# Patient Record
Sex: MALE | Race: WHITE | NOT HISPANIC OR LATINO | Employment: OTHER | URBAN - METROPOLITAN AREA
[De-identification: names, ages, dates, MRNs, and addresses within clinical notes are randomized per-mention and may not be internally consistent; named-entity substitution may affect disease eponyms.]

---

## 2017-08-23 ENCOUNTER — GENERIC CONVERSION - ENCOUNTER (OUTPATIENT)
Dept: OTHER | Facility: OTHER | Age: 82
End: 2017-08-23

## 2017-08-29 ENCOUNTER — ALLSCRIPTS OFFICE VISIT (OUTPATIENT)
Dept: OTHER | Facility: OTHER | Age: 82
End: 2017-08-29

## 2017-08-29 LAB
BILIRUB UR QL STRIP: NORMAL
CLARITY UR: NORMAL
COLOR UR: NORMAL
GLUCOSE (HISTORICAL): NEGATIVE
HGB UR QL STRIP.AUTO: NORMAL
KETONES UR STRIP-MCNC: NEGATIVE MG/DL
LEUKOCYTE ESTERASE UR QL STRIP: NORMAL
NITRITE UR QL STRIP: NEGATIVE
PH UR STRIP.AUTO: 5.5 [PH]
PROT UR STRIP-MCNC: NORMAL MG/DL
SP GR UR STRIP.AUTO: 1.02
UROBILINOGEN UR QL STRIP.AUTO: 0.2

## 2018-01-01 ENCOUNTER — TRANSCRIBE ORDERS (OUTPATIENT)
Dept: ADMINISTRATIVE | Facility: HOSPITAL | Age: 83
End: 2018-01-01

## 2018-01-01 ENCOUNTER — APPOINTMENT (OUTPATIENT)
Dept: LAB | Facility: HOSPITAL | Age: 83
End: 2018-01-01
Payer: COMMERCIAL

## 2018-01-01 ENCOUNTER — OFFICE VISIT (OUTPATIENT)
Dept: URGENT CARE | Facility: CLINIC | Age: 83
End: 2018-01-01
Payer: COMMERCIAL

## 2018-01-01 ENCOUNTER — HOSPITAL ENCOUNTER (EMERGENCY)
Facility: HOSPITAL | Age: 83
Discharge: HOME | End: 2018-10-08
Attending: EMERGENCY MEDICINE
Payer: COMMERCIAL

## 2018-01-01 VITALS
SYSTOLIC BLOOD PRESSURE: 117 MMHG | HEART RATE: 78 BPM | OXYGEN SATURATION: 95 % | DIASTOLIC BLOOD PRESSURE: 73 MMHG | RESPIRATION RATE: 18 BRPM | BODY MASS INDEX: 25.18 KG/M2 | TEMPERATURE: 97.4 F | HEIGHT: 69 IN | WEIGHT: 170 LBS

## 2018-01-01 VITALS
HEART RATE: 53 BPM | SYSTOLIC BLOOD PRESSURE: 143 MMHG | TEMPERATURE: 98.3 F | DIASTOLIC BLOOD PRESSURE: 60 MMHG | OXYGEN SATURATION: 97 % | RESPIRATION RATE: 16 BRPM

## 2018-01-01 DIAGNOSIS — Z79.01 LONG TERM (CURRENT) USE OF ANTICOAGULANTS: Primary | ICD-10-CM

## 2018-01-01 DIAGNOSIS — Z79.01 LONG TERM (CURRENT) USE OF ANTICOAGULANTS: ICD-10-CM

## 2018-01-01 DIAGNOSIS — I48.91 ATRIAL FIBRILLATION, UNSPECIFIED TYPE (HCC): ICD-10-CM

## 2018-01-01 DIAGNOSIS — S09.90XA INJURY OF HEAD, INITIAL ENCOUNTER: Primary | ICD-10-CM

## 2018-01-01 DIAGNOSIS — I48.91 ATRIAL FIBRILLATION, UNSPECIFIED TYPE (HCC): Primary | ICD-10-CM

## 2018-01-01 DIAGNOSIS — S91.209A AVULSION OF TOENAIL OF LEFT FOOT: Primary | ICD-10-CM

## 2018-01-01 DIAGNOSIS — S09.90XA HEAD INJURY: ICD-10-CM

## 2018-01-01 LAB
INR PPP: 2.07 (ref 0.9–1.5)
INR PPP: 2.08 (ref 0.9–1.5)
INR PPP: 2.19 (ref 0.9–1.5)
INR PPP: 2.32 (ref 0.9–1.5)
INR PPP: 2.6 (ref 0.9–1.5)
INR PPP: 2.7 (ref 0.9–1.5)
INR PPP: 2.78 (ref 0.9–1.5)
INR PPP: 3.63 (ref 0.9–1.5)
PROTHROMBIN TIME: 24 SECONDS (ref 10.2–13)
PROTHROMBIN TIME: 24.1 SECONDS (ref 10.2–13)
PROTHROMBIN TIME: 25.6 SECONDS (ref 10.1–12.9)
PROTHROMBIN TIME: 27.2 SECONDS (ref 10.1–12.9)
PROTHROMBIN TIME: 30.2 SECONDS (ref 10.2–13)
PROTHROMBIN TIME: 31.4 SECONDS (ref 10.2–13)
PROTHROMBIN TIME: 32.7 SECONDS (ref 10.1–12.9)
PROTHROMBIN TIME: 42.3 SECONDS (ref 10.2–13)

## 2018-01-01 PROCEDURE — 36415 COLL VENOUS BLD VENIPUNCTURE: CPT

## 2018-01-01 PROCEDURE — 85610 PROTHROMBIN TIME: CPT

## 2018-01-01 PROCEDURE — 99202 OFFICE O/P NEW SF 15 MIN: CPT | Performed by: NURSE PRACTITIONER

## 2018-01-01 PROCEDURE — 99283 EMERGENCY DEPT VISIT LOW MDM: CPT

## 2018-01-01 RX ORDER — WARFARIN SODIUM 2.5 MG/1
2.5 TABLET ORAL
COMMUNITY
Start: 2018-06-13 | End: 2019-09-30 | Stop reason: HOSPADM

## 2018-01-01 RX ORDER — LEVOTHYROXINE SODIUM 0.1 MG/1
TABLET ORAL
COMMUNITY
End: 2019-09-30 | Stop reason: HOSPADM

## 2018-01-14 VITALS
WEIGHT: 171.38 LBS | BODY MASS INDEX: 25.97 KG/M2 | HEIGHT: 68 IN | DIASTOLIC BLOOD PRESSURE: 76 MMHG | SYSTOLIC BLOOD PRESSURE: 128 MMHG

## 2018-03-27 LAB
INR PPP: 2.42 (ref 0.9–1.5)
PROTHROMBIN TIME (HISTORICAL): 28.3 SEC (ref 10.1–12.9)

## 2018-04-14 LAB
ALBUMIN SERPL BCP-MCNC: 3.8 G/DL (ref 3.5–5.7)
ALP SERPL-CCNC: 98 IU/L (ref 55–165)
ALT SERPL W P-5'-P-CCNC: 15 IU/L (ref 7–26)
ANION GAP SERPL CALCULATED.3IONS-SCNC: 14.6 MM/L
ANISOCYTOSIS (HISTORICAL): SLIGHT
APTT PPP: 32 SEC (ref 24.4–37.6)
AST SERPL W P-5'-P-CCNC: 30 U/L (ref 8–27)
BACTERIA UR QL AUTO: ABNORMAL
BASOPHILS # BLD AUTO: 0.1 X3/UL (ref 0–0.3)
BASOPHILS # BLD AUTO: 1 % (ref 0–2)
BASOPHILS # BLD AUTO: 1 % (ref 0–2)
BILIRUB SERPL-MCNC: 1 MG/DL (ref 0.3–1)
BILIRUB UR QL STRIP: NEGATIVE
BUN SERPL-MCNC: 20 MG/DL (ref 7–25)
CALCIUM SERPL-MCNC: 8.7 MG/DL (ref 8.6–10.5)
CHLORIDE SERPL-SCNC: 105 MM/L (ref 98–107)
CLARITY UR: CLEAR
CO2 SERPL-SCNC: 21 MM/L (ref 21–31)
COLOR UR: YELLOW
CREAT SERPL-MCNC: 1.35 MG/DL (ref 0.7–1.3)
DEPRECATED RDW RBC AUTO: 14.2 % (ref 11.5–14.5)
EGFR (HISTORICAL): 49 GFR
EGFR AFRICAN AMERICAN (HISTORICAL): 60 GFR
EOSINOPHIL # BLD AUTO: 0.1 X3/UL (ref 0–0.5)
EOSINOPHIL # BLD AUTO: 1 % (ref 0–5)
EOSINOPHIL NFR BLD AUTO: 1 % (ref 0–5)
GLUCOSE (HISTORICAL): 115 MG/DL (ref 65–99)
GLUCOSE UR STRIP-MCNC: NEGATIVE MG/DL
HCT VFR BLD AUTO: 41.7 % (ref 42–52)
HGB BLD-MCNC: 13.4 G/DL (ref 14–18)
HGB UR QL STRIP.AUTO: ABNORMAL
INR PPP: 2.5 (ref 0.9–1.5)
KETONES UR STRIP-MCNC: NEGATIVE MG/DL
LEUKOCYTE ESTERASE UR QL STRIP: ABNORMAL
LYMPHOCYTES # BLD AUTO: 1 X3/UL (ref 1.2–4.2)
LYMPHOCYTES NFR BLD AUTO: 13 % (ref 20.5–51.1)
LYMPHOCYTES NFR BLD AUTO: 16 % (ref 20.5–51.1)
MACROCYTOSIS (HISTORICAL): SLIGHT
MCH RBC QN AUTO: 30.8 PG (ref 26–34)
MCHC RBC AUTO-ENTMCNC: 32.2 G/DL (ref 31–36)
MCV RBC AUTO: 95.5 FL (ref 81–99)
MONOCYTES # BLD AUTO: 1.3 X3/UL (ref 0–1)
MONOCYTES (HISTORICAL): 22 % (ref 1.7–12)
MONOCYTES NFR BLD AUTO: 20.8 % (ref 1.7–12)
MUCUS THREADS (HISTORICAL): ABNORMAL /HPF
NEUTROPHILS # BLD AUTO: 3.9 X3/UL (ref 1.4–6.5)
NEUTROPHILS ABS COUNT (HISTORICAL): 63 % (ref 42.2–75.2)
NEUTS SEG NFR BLD AUTO: 61.2 % (ref 42.2–75.2)
NITRITE UR QL STRIP: NEGATIVE
NON-SQ EPI CELLS URNS QL MICRO: ABNORMAL /HPF
OSMOLALITY, SERUM (HISTORICAL): 275 MOSM (ref 262–291)
OVALOCYTOSIS (HISTORICAL): SLIGHT
PH UR STRIP.AUTO: 5.5 [PH] (ref 4.5–8)
PLATELET # BLD AUTO: 231 X3/UL (ref 130–400)
PLATELET ESTIMATE (HISTORICAL): NORMAL
PMV BLD AUTO: 8.5 FL (ref 8.6–11.7)
POTASSIUM SERPL-SCNC: 4.6 MM/L (ref 3.5–5.5)
PROT UR STRIP-MCNC: NEGATIVE MG/DL
PROTHROMBIN TIME (HISTORICAL): 29.6 SEC (ref 10.1–12.9)
RBC # BLD AUTO: 4.37 X6/UL (ref 4.3–5.9)
RBC #/AREA URNS AUTO: ABNORMAL /HPF
SCHISTOCYTOSIS (HISTORICAL): SLIGHT
SODIUM SERPL-SCNC: 136 MM/L (ref 134–143)
SP GR UR STRIP.AUTO: 1.02 (ref 1–1.03)
TOTAL PROTEIN (HISTORICAL): 6.3 G/DL (ref 6.4–8.9)
UROBILINOGEN UR QL STRIP.AUTO: 0.2 EU/DL (ref 0.2–8)
WBC # BLD AUTO: 6.4 X3/UL (ref 4.8–10.8)
WBC #/AREA URNS AUTO: ABNORMAL /HPF

## 2018-04-15 LAB
ALBUMIN SERPL BCP-MCNC: 3.4 G/DL (ref 3.5–5.7)
ALP SERPL-CCNC: 91 IU/L (ref 55–165)
ALT SERPL W P-5'-P-CCNC: 13 IU/L (ref 7–26)
ANION GAP SERPL CALCULATED.3IONS-SCNC: 11.3 MM/L
AST SERPL W P-5'-P-CCNC: 23 U/L (ref 8–27)
BASOPHILS # BLD AUTO: 0 X3/UL (ref 0–0.3)
BASOPHILS # BLD AUTO: 1 % (ref 0–2)
BILIRUB SERPL-MCNC: 1.2 MG/DL (ref 0.3–1)
BUN SERPL-MCNC: 18 MG/DL (ref 7–25)
CALCIUM SERPL-MCNC: 8.4 MG/DL (ref 8.6–10.5)
CHLORIDE SERPL-SCNC: 107 MM/L (ref 98–107)
CO2 SERPL-SCNC: 22 MM/L (ref 21–31)
CREAT SERPL-MCNC: 1.29 MG/DL (ref 0.7–1.3)
DEPRECATED RDW RBC AUTO: 14.1 % (ref 11.5–14.5)
EGFR (HISTORICAL): 52 GFR
EGFR AFRICAN AMERICAN (HISTORICAL): > 60 GFR
EOSINOPHIL # BLD AUTO: 0 X3/UL (ref 0–0.5)
EOSINOPHIL NFR BLD AUTO: 1 % (ref 0–5)
GLUCOSE (HISTORICAL): 92 MG/DL (ref 65–99)
HCT VFR BLD AUTO: 38.3 % (ref 42–52)
HGB BLD-MCNC: 12.9 G/DL (ref 14–18)
INR PPP: 2.39 (ref 0.9–1.5)
LYMPHOCYTES # BLD AUTO: 1.2 X3/UL (ref 1.2–4.2)
LYMPHOCYTES NFR BLD AUTO: 26 % (ref 20.5–51.1)
MAGNESIUM SERPL-MCNC: 1.8 MG/DL (ref 1.9–2.7)
MCH RBC QN AUTO: 32.6 PG (ref 26–34)
MCHC RBC AUTO-ENTMCNC: 33.7 G/DL (ref 31–36)
MCV RBC AUTO: 96.5 FL (ref 81–99)
MONOCYTES # BLD AUTO: 1.2 X3/UL (ref 0–1)
MONOCYTES NFR BLD AUTO: 26.7 % (ref 1.7–12)
NEUTROPHILS # BLD AUTO: 2.1 X3/UL (ref 1.4–6.5)
NEUTS SEG NFR BLD AUTO: 45.3 % (ref 42.2–75.2)
OSMOLALITY, SERUM (HISTORICAL): 273 MOSM (ref 262–291)
PHOSPHATE SERPL-MCNC: 2.8 MG/DL (ref 3–5.5)
PLATELET # BLD AUTO: 192 X3/UL (ref 130–400)
PMV BLD AUTO: 8.1 FL (ref 8.6–11.7)
POTASSIUM SERPL-SCNC: 4.3 MM/L (ref 3.5–5.5)
PROTHROMBIN TIME (HISTORICAL): 28.2 SEC (ref 10.1–12.9)
RBC # BLD AUTO: 3.97 X6/UL (ref 4.3–5.9)
SODIUM SERPL-SCNC: 136 MM/L (ref 134–143)
TOTAL PROTEIN (HISTORICAL): 5.6 G/DL (ref 6.4–8.9)
TROPONIN I SERPL-MCNC: 0.06 NG/ML
TSH SERPL DL<=0.05 MIU/L-ACNC: 1.83 UIU/M (ref 0.45–5.33)
WBC # BLD AUTO: 4.6 X3/UL (ref 4.8–10.8)

## 2018-04-16 LAB
ANION GAP SERPL CALCULATED.3IONS-SCNC: 12.8 MM/L
BASOPHILS # BLD AUTO: 0 X3/UL (ref 0–0.3)
BASOPHILS # BLD AUTO: 0.7 % (ref 0–2)
BUN SERPL-MCNC: 18 MG/DL (ref 7–25)
CALCIUM SERPL-MCNC: 8.6 MG/DL (ref 8.6–10.5)
CHLORIDE SERPL-SCNC: 103 MM/L (ref 98–107)
CO2 SERPL-SCNC: 24 MM/L (ref 21–31)
CREAT SERPL-MCNC: 1.39 MG/DL (ref 0.7–1.3)
DEPRECATED RDW RBC AUTO: 14.4 % (ref 11.5–14.5)
EGFR (HISTORICAL): 48 GFR
EGFR AFRICAN AMERICAN (HISTORICAL): 58 GFR
EOSINOPHIL # BLD AUTO: 0 X3/UL (ref 0–0.5)
EOSINOPHIL NFR BLD AUTO: 0.2 % (ref 0–5)
GLUCOSE (HISTORICAL): 89 MG/DL (ref 65–99)
HCT VFR BLD AUTO: 41.2 % (ref 42–52)
HGB BLD-MCNC: 13.5 G/DL (ref 14–18)
INFLUENZA A (VIRAL ID) (HISTORICAL): NEGATIVE
INFLUENZA B (VIRAL ID) (HISTORICAL): POSITIVE
INR PPP: 2.14 (ref 0.9–1.5)
LYMPHOCYTES # BLD AUTO: 1.4 X3/UL (ref 1.2–4.2)
LYMPHOCYTES NFR BLD AUTO: 21 % (ref 20.5–51.1)
MAGNESIUM SERPL-MCNC: 1.9 MG/DL (ref 1.9–2.7)
MCH RBC QN AUTO: 31.9 PG (ref 26–34)
MCHC RBC AUTO-ENTMCNC: 32.6 G/DL (ref 31–36)
MCV RBC AUTO: 97.6 FL (ref 81–99)
MONOCYTES # BLD AUTO: 1.1 X3/UL (ref 0–1)
MONOCYTES NFR BLD AUTO: 16.8 % (ref 1.7–12)
NEUTROPHILS # BLD AUTO: 4 X3/UL (ref 1.4–6.5)
NEUTS SEG NFR BLD AUTO: 61.3 % (ref 42.2–75.2)
OSMOLALITY, SERUM (HISTORICAL): 271 MOSM (ref 262–291)
PHOSPHATE SERPL-MCNC: 3.2 MG/DL (ref 3–5.5)
PLATELET # BLD AUTO: 184 X3/UL (ref 130–400)
PMV BLD AUTO: 8.8 FL (ref 8.6–11.7)
POTASSIUM SERPL-SCNC: 4.8 MM/L (ref 3.5–5.5)
PROTHROMBIN TIME (HISTORICAL): 25.2 SEC (ref 10.1–12.9)
RBC # BLD AUTO: 4.23 X6/UL (ref 4.3–5.9)
SODIUM SERPL-SCNC: 135 MM/L (ref 134–143)
WBC # BLD AUTO: 6.6 X3/UL (ref 4.8–10.8)

## 2018-04-17 LAB
INR PPP: 1.81 (ref 0.9–1.5)
PROTHROMBIN TIME (HISTORICAL): 21.2 SEC (ref 10.1–12.9)

## 2018-04-19 LAB
ANION GAP SERPL CALCULATED.3IONS-SCNC: 13 MM/L
BASOPHILS # BLD AUTO: 0 X3/UL (ref 0–0.3)
BASOPHILS # BLD AUTO: 0.5 % (ref 0–2)
BUN SERPL-MCNC: 14 MG/DL (ref 7–25)
CALCIUM SERPL-MCNC: 8.4 MG/DL (ref 8.6–10.5)
CHLORIDE SERPL-SCNC: 104 MM/L (ref 98–107)
CO2 SERPL-SCNC: 21 MM/L (ref 21–31)
CREAT SERPL-MCNC: 1.1 MG/DL (ref 0.7–1.3)
DEPRECATED RDW RBC AUTO: 14.4 % (ref 11.5–14.5)
EGFR (HISTORICAL): > 60 GFR
EGFR AFRICAN AMERICAN (HISTORICAL): > 60 GFR
EOSINOPHIL # BLD AUTO: 0.1 X3/UL (ref 0–0.5)
EOSINOPHIL NFR BLD AUTO: 1.1 % (ref 0–5)
GLUCOSE (HISTORICAL): 83 MG/DL (ref 65–99)
HCT VFR BLD AUTO: 43.5 % (ref 42–52)
HGB BLD-MCNC: 14.4 G/DL (ref 14–18)
INR PPP: 2.5 (ref 0.9–1.5)
LYMPHOCYTES # BLD AUTO: 1.9 X3/UL (ref 1.2–4.2)
LYMPHOCYTES NFR BLD AUTO: 39.2 % (ref 20.5–51.1)
MCH RBC QN AUTO: 31.4 PG (ref 26–34)
MCHC RBC AUTO-ENTMCNC: 33.2 G/DL (ref 31–36)
MCV RBC AUTO: 94.7 FL (ref 81–99)
MONOCYTES # BLD AUTO: 0.7 X3/UL (ref 0–1)
MONOCYTES NFR BLD AUTO: 15 % (ref 1.7–12)
NEUTROPHILS # BLD AUTO: 2.1 X3/UL (ref 1.4–6.5)
NEUTS SEG NFR BLD AUTO: 44.2 % (ref 42.2–75.2)
OSMOLALITY, SERUM (HISTORICAL): 268 MOSM (ref 262–291)
PLATELET # BLD AUTO: 166 X3/UL (ref 130–400)
PMV BLD AUTO: 9.5 FL (ref 8.6–11.7)
POTASSIUM SERPL-SCNC: 4 MM/L (ref 3.5–5.5)
PROTHROMBIN TIME (HISTORICAL): 29.6 SEC (ref 10.1–12.9)
RBC # BLD AUTO: 4.59 X6/UL (ref 4.3–5.9)
SODIUM SERPL-SCNC: 134 MM/L (ref 134–143)
WBC # BLD AUTO: 4.8 X3/UL (ref 4.8–10.8)

## 2018-04-24 LAB
INR PPP: 5.61 (ref 0.9–1.5)
PROTHROMBIN TIME (HISTORICAL): 66.8 SEC (ref 10.1–12.9)

## 2018-04-27 LAB
INR PPP: 3.39 (ref 0.9–1.5)
PROTHROMBIN TIME (HISTORICAL): 40 SEC (ref 10.1–12.9)

## 2018-04-30 LAB
INR PPP: 2.84 (ref 0.9–1.5)
PROTHROMBIN TIME (HISTORICAL): 33.4 SEC (ref 10.1–12.9)

## 2018-05-08 LAB
INR PPP: 3.22 (ref 0.9–1.5)
PROTHROMBIN TIME (HISTORICAL): 37.9 SEC (ref 10.1–12.9)

## 2018-05-16 LAB
INR PPP: 2.72 (ref 0.9–1.5)
PROTHROMBIN TIME (HISTORICAL): 31.9 SEC (ref 10.1–12.9)

## 2018-05-29 LAB
INR PPP: 2.38 (ref 0.9–1.5)
PROTHROMBIN TIME (HISTORICAL): 27.8 SEC (ref 10.1–12.9)

## 2018-06-12 LAB
INR PPP: 2.68 (ref 0.9–1.5)
PROTHROMBIN TIME (HISTORICAL): 31.5 SEC (ref 10.1–12.9)

## 2018-06-26 ENCOUNTER — TRANSCRIBE ORDERS (OUTPATIENT)
Dept: ADMINISTRATIVE | Facility: HOSPITAL | Age: 83
End: 2018-06-26

## 2018-06-26 ENCOUNTER — APPOINTMENT (OUTPATIENT)
Dept: LAB | Facility: HOSPITAL | Age: 83
End: 2018-06-26
Payer: COMMERCIAL

## 2018-06-26 DIAGNOSIS — I48.91 ATRIAL FIBRILLATION, UNSPECIFIED TYPE (HCC): ICD-10-CM

## 2018-06-26 DIAGNOSIS — Z79.01 LONG TERM (CURRENT) USE OF ANTICOAGULANTS: Primary | ICD-10-CM

## 2018-06-26 DIAGNOSIS — Z79.01 LONG TERM (CURRENT) USE OF ANTICOAGULANTS: ICD-10-CM

## 2018-06-26 LAB
INR PPP: 2.32 (ref 0.9–1.5)
PROTHROMBIN TIME: 27.1 SECONDS (ref 10.1–12.9)

## 2018-06-26 PROCEDURE — 36415 COLL VENOUS BLD VENIPUNCTURE: CPT

## 2018-06-26 PROCEDURE — 85610 PROTHROMBIN TIME: CPT

## 2018-07-18 ENCOUNTER — APPOINTMENT (OUTPATIENT)
Dept: LAB | Facility: HOSPITAL | Age: 83
End: 2018-07-18
Payer: COMMERCIAL

## 2018-07-18 ENCOUNTER — TRANSCRIBE ORDERS (OUTPATIENT)
Dept: ADMINISTRATIVE | Facility: HOSPITAL | Age: 83
End: 2018-07-18

## 2018-07-18 DIAGNOSIS — I48.91 ATRIAL FIBRILLATION, UNSPECIFIED TYPE (HCC): ICD-10-CM

## 2018-07-18 DIAGNOSIS — Z79.01 LONG TERM (CURRENT) USE OF ANTICOAGULANTS: ICD-10-CM

## 2018-07-18 DIAGNOSIS — Z79.01 LONG TERM (CURRENT) USE OF ANTICOAGULANTS: Primary | ICD-10-CM

## 2018-07-18 LAB
INR PPP: 2.06 (ref 0.9–1.5)
PROTHROMBIN TIME: 24 SECONDS (ref 10.1–12.9)

## 2018-07-18 PROCEDURE — 36415 COLL VENOUS BLD VENIPUNCTURE: CPT

## 2018-07-18 PROCEDURE — 85610 PROTHROMBIN TIME: CPT

## 2018-08-07 ENCOUNTER — APPOINTMENT (OUTPATIENT)
Dept: LAB | Facility: HOSPITAL | Age: 83
End: 2018-08-07
Payer: COMMERCIAL

## 2018-08-07 ENCOUNTER — TRANSCRIBE ORDERS (OUTPATIENT)
Dept: ADMINISTRATIVE | Facility: HOSPITAL | Age: 83
End: 2018-08-07

## 2018-08-07 DIAGNOSIS — I48.91 ATRIAL FIBRILLATION, UNSPECIFIED TYPE (HCC): ICD-10-CM

## 2018-08-07 DIAGNOSIS — Z79.01 LONG TERM (CURRENT) USE OF ANTICOAGULANTS: ICD-10-CM

## 2018-08-07 DIAGNOSIS — Z79.01 LONG TERM (CURRENT) USE OF ANTICOAGULANTS: Primary | ICD-10-CM

## 2018-08-07 LAB
INR PPP: 2.31 (ref 0.9–1.5)
PROTHROMBIN TIME: 27 SECONDS (ref 10.1–12.9)

## 2018-08-07 PROCEDURE — 85610 PROTHROMBIN TIME: CPT

## 2018-08-07 PROCEDURE — 36415 COLL VENOUS BLD VENIPUNCTURE: CPT

## 2018-09-04 ENCOUNTER — TRANSCRIBE ORDERS (OUTPATIENT)
Dept: ADMINISTRATIVE | Facility: HOSPITAL | Age: 83
End: 2018-09-04

## 2018-09-04 ENCOUNTER — APPOINTMENT (OUTPATIENT)
Dept: LAB | Facility: HOSPITAL | Age: 83
End: 2018-09-04
Payer: COMMERCIAL

## 2018-09-04 DIAGNOSIS — Z79.01 LONG TERM (CURRENT) USE OF ANTICOAGULANTS: Primary | ICD-10-CM

## 2018-09-04 DIAGNOSIS — Z79.01 LONG TERM (CURRENT) USE OF ANTICOAGULANTS: ICD-10-CM

## 2018-09-04 DIAGNOSIS — I48.91 ATRIAL FIBRILLATION, UNSPECIFIED TYPE (HCC): ICD-10-CM

## 2018-09-04 LAB
INR PPP: 2.6 (ref 0.9–1.5)
PROTHROMBIN TIME: 30.5 SECONDS (ref 10.1–12.9)

## 2018-09-04 PROCEDURE — 85610 PROTHROMBIN TIME: CPT

## 2018-09-04 PROCEDURE — 36415 COLL VENOUS BLD VENIPUNCTURE: CPT

## 2018-09-18 ENCOUNTER — APPOINTMENT (OUTPATIENT)
Dept: LAB | Facility: HOSPITAL | Age: 83
End: 2018-09-18
Payer: COMMERCIAL

## 2018-09-18 ENCOUNTER — TRANSCRIBE ORDERS (OUTPATIENT)
Dept: ADMINISTRATIVE | Facility: HOSPITAL | Age: 83
End: 2018-09-18

## 2018-09-18 DIAGNOSIS — I48.91 ATRIAL FIBRILLATION, UNSPECIFIED TYPE (HCC): ICD-10-CM

## 2018-09-18 DIAGNOSIS — Z79.01 LONG TERM (CURRENT) USE OF ANTICOAGULANTS: ICD-10-CM

## 2018-09-18 DIAGNOSIS — Z79.01 LONG TERM (CURRENT) USE OF ANTICOAGULANTS: Primary | ICD-10-CM

## 2018-09-18 LAB
INR PPP: 2.64 (ref 0.9–1.5)
PROTHROMBIN TIME: 31 SECONDS (ref 10.1–12.9)

## 2018-09-18 PROCEDURE — 36415 COLL VENOUS BLD VENIPUNCTURE: CPT

## 2018-09-18 PROCEDURE — 85610 PROTHROMBIN TIME: CPT

## 2018-10-08 NOTE — ED NOTES
Pt was at home and fell out of the bed  Pt lost his right nail to the right great toe  Noted bump to the right side of head   And Small laceration to the the right eye      Elizabeth Nicole RN  10/08/18 1257

## 2018-10-08 NOTE — PROGRESS NOTES
[facility]        NAME: Richardson Gupta is a 80 y o  male  : 3/4/1924    MRN: [id number]  DATE: 2018  TIME: 12:30 PM    Assessment and Plan   Injury of head, initial encounter [S09 90XA]  1  Injury of head, initial encounter           Patient Instructions     Patient Instructions   Go to ER for evaluation- pt  And daughter agree    Follow up with PCP in 3-5 days  Proceed to  ER if symptoms worsen  Chief Complaint     Chief Complaint   Patient presents with    Head Injury     Pt fell out of bed earlier today and hit his head  Pt aslo ripped his right great toenail off  History of Present Illness       Pt  Bettina Serum out of bed while dreaming- he was "reaching for a tent in the store"  Hit head above left eye and ripped right toenail off  Is on Coumadin- here with daughter        Review of Systems   Review of Systems   Constitutional: Negative for activity change, diaphoresis, fatigue and fever  HENT: Negative for congestion, facial swelling, hearing loss, rhinorrhea, sinus pain, sinus pressure, sneezing, sore throat and voice change  Eyes: Negative for discharge and visual disturbance  Respiratory: Negative for cough, choking, chest tightness, shortness of breath, wheezing and stridor  Cardiovascular: Negative for chest pain, palpitations and leg swelling  Gastrointestinal: Negative for abdominal distention, abdominal pain, constipation, diarrhea, nausea and vomiting  Endocrine: Negative for polydipsia, polyphagia and polyuria  Genitourinary: Negative for difficulty urinating, dysuria, frequency and urgency  Musculoskeletal: Positive for gait problem  Negative for arthralgias, back pain, joint swelling, myalgias, neck pain and neck stiffness  Skin: Positive for wound  Negative for color change and rash  See hpi   Neurological: Negative for dizziness, syncope, speech difficulty, weakness, light-headedness and headaches     Hematological: Negative for adenopathy  Does not bruise/bleed easily  Psychiatric/Behavioral: Negative for agitation, behavioral problems, confusion, hallucinations, sleep disturbance and suicidal ideas  The patient is not nervous/anxious  Current Medications       Current Outpatient Prescriptions:     metoprolol tartrate (LOPRESSOR) 25 mg tablet, Take 12 5 mg by mouth, Disp: , Rfl:     warfarin (COUMADIN) 2 5 mg tablet, Take 2 5 mg by mouth, Disp: , Rfl:     levothyroxine 100 mcg tablet, Take by mouth, Disp: , Rfl:     Current Allergies     Allergies as of 10/08/2018    (No Known Allergies)            The following portions of the patient's history were reviewed and updated as appropriate: allergies, current medications, past family history, past medical history, past social history, past surgical history and problem list      No past medical history on file  No past surgical history on file  No family history on file  Medications have been verified  Objective   /60   Pulse (!) 53   Temp 98 3 °F (36 8 °C)   Resp 16   SpO2 97%        Physical Exam     Physical Exam   Constitutional: He is oriented to person, place, and time  He appears well-developed and well-nourished  No distress  Cardiovascular: Normal rate, regular rhythm and normal heart sounds  No murmur heard  Pulmonary/Chest: Effort normal and breath sounds normal  No respiratory distress  He has no wheezes  Musculoskeletal: Normal range of motion  Neurological: He is alert and oriented to person, place, and time  Skin: Skin is warm and dry  He is not diaphoretic  Psychiatric: He has a normal mood and affect  His behavior is normal  Judgment and thought content normal    Nursing note and vitals reviewed

## 2018-10-08 NOTE — ED PROVIDER NOTES
History  Chief Complaint   Patient presents with    Fall     right big toe injury  nail came off  mild bleeding  sent in by urgent care  80YEAR-OLD MALE WITH A HISTORY OF HYPERTENSION PRESENTS TO THE EMERGENCY DEPARTMENT WITH LEFT GREAT TOENAIL AVULSION AS WELL AS A HEAD INJURY AFTER FALLING AT HOME EARLIER TODAY  PATIENT DENIES ANY ANTECEDENT EVENT THAT LED UP TO THE FALL BUT MERELY HAD GOTTEN OUT OF BED SUDDENLY ACCORDING TO HIS DAUGHTER HE IS APPROPRIATE WITH NO CHANGES IN COGNITIVE /BEHAVIORAL FUNCTION  HE WAS AMBULATORY AFTER THE EVEN  S PATIENT DENIES PAIN  HE HAD VISITED [address] [place] [address] BUT BECAUSE THE USE OF COUMADIN THE PATIENT WAS REFERRED TO THE EMERGENCY DEPARTMENT FOR FURTHER EVALUATION  THERE HAS BEEN NO LOSS OF CONSCIOUSNESS NO REPORTED BLEEDING FROM ANY SOURCE  Prior to Admission Medications   Prescriptions Last Dose Informant Patient Reported? Taking?   levothyroxine 100 mcg tablet   Yes No   Sig: Take by mouth   metoprolol tartrate (LOPRESSOR) 25 mg tablet   Yes No   Sig: Take 12 5 mg by mouth   warfarin (COUMADIN) 2 5 mg tablet   Yes No   Sig: Take 2 5 mg by mouth      Facility-Administered Medications: None       Past Medical History:   Diagnosis Date    Atrial fibrillation (HCC)     Disease of thyroid gland     Hypertension        History reviewed  No pertinent surgical history  History reviewed  No pertinent family history  I have reviewed and agree with the history as documented  Social History   Substance Use Topics    Smoking status: Never Smoker    Smokeless tobacco: Never Used    Alcohol use No        Review of Systems   Constitutional: Negative for chills and fever  HENT: Negative for ear pain, rhinorrhea and sore throat  Eyes: Negative for pain, redness and visual disturbance  Respiratory: Negative for cough and shortness of breath  Cardiovascular: Negative for chest pain and leg swelling     Gastrointestinal: Negative for abdominal pain, diarrhea, nausea and vomiting  Genitourinary: Negative for dysuria, flank pain, frequency and urgency  Musculoskeletal: Negative for back pain, myalgias and neck pain  Skin: Negative for rash  Neurological: Negative for dizziness, weakness, light-headedness and headaches  Hematological: Negative  Psychiatric/Behavioral: Negative for agitation, confusion and suicidal ideas  The patient is not nervous/anxious  All other systems reviewed and are negative  Physical Exam  Physical Exam   Constitutional: He is oriented to person, place, and time  He appears well-developed and well-nourished  HENT:   Nose: Nose normal    Mouth/Throat: Oropharynx is clear and moist  No oropharyngeal exudate  A SUPERFICIAL ABRASION TO THE LEFT SIDE OF THE FOREHEAD  THERE ARE NO CALVARIAL DEFECT  Eyes: Pupils are equal, round, and reactive to light  Conjunctivae and EOM are normal  No scleral icterus  Neck: Normal range of motion  Neck supple  No JVD present  No tracheal deviation present  Cardiovascular: Normal rate, regular rhythm and normal heart sounds  No murmur heard  Pulmonary/Chest: Effort normal and breath sounds normal  No respiratory distress  He has no wheezes  He has no rales  Abdominal: Soft  Bowel sounds are normal  There is no tenderness  There is no guarding  Musculoskeletal: Normal range of motion  He exhibits no edema or tenderness  THE LEFT GREAT TOENAIL WAS AVULSED  THERE IS NO BLEEDING FROM THE BED  THERE IS NO BONY DEFORMITY A BUNION IS PRESENT ON THE LEFT LATERAL MCP JOINT  OF THE GREAT TOE  Neurological: He is alert and oriented to person, place, and time  No cranial nerve deficit or sensory deficit  He exhibits normal muscle tone  5/5 motor, nl sens   Skin: Skin is warm and dry  Psychiatric: He has a normal mood and affect  His behavior is normal    Nursing note and vitals reviewed        Vital Signs  ED Triage Vitals [10/08/18 1236]   Temperature Pulse Respirations Blood Pressure SpO2   (!) 97 4 °F (36 3 °C) (!) 52 16 117/73 95 %      Temp Source Heart Rate Source Patient Position - Orthostatic VS BP Location FiO2 (%)   Temporal Monitor Sitting Left arm --      Pain Score       No Pain           Vitals:    10/08/18 1236   BP: 117/73   Pulse: (!) 52   Patient Position - Orthostatic VS: Sitting       Visual Acuity      ED Medications  Medications - No data to display    Diagnostic Studies  Results Reviewed     None                 No orders to display              Procedures  Procedures       Phone Contacts  ED Phone Contact    ED Course                               MDM  CritCare Time    Disposition  Final diagnoses:   None     ED Disposition     None      Follow-up Information    None         Patient's Medications   Discharge Prescriptions    No medications on file     No discharge procedures on file      ED Provider  Electronically Signed by           Stuart Garcia MD  10/08/18 9001

## 2019-01-01 ENCOUNTER — TRANSCRIBE ORDERS (OUTPATIENT)
Dept: ADMINISTRATIVE | Facility: HOSPITAL | Age: 84
End: 2019-01-01

## 2019-01-01 ENCOUNTER — APPOINTMENT (OUTPATIENT)
Dept: LAB | Facility: HOSPITAL | Age: 84
End: 2019-01-01
Payer: COMMERCIAL

## 2019-01-01 ENCOUNTER — TRANSCRIBE ORDERS (OUTPATIENT)
Dept: URGENT CARE | Facility: HOSPITAL | Age: 84
End: 2019-01-01

## 2019-01-01 ENCOUNTER — APPOINTMENT (INPATIENT)
Dept: RADIOLOGY | Facility: HOSPITAL | Age: 84
DRG: 064 | End: 2019-01-01
Payer: COMMERCIAL

## 2019-01-01 ENCOUNTER — HOSPITAL ENCOUNTER (OUTPATIENT)
Facility: HOSPITAL | Age: 84
End: 2019-09-30
Attending: INTERNAL MEDICINE | Admitting: INTERNAL MEDICINE

## 2019-01-01 ENCOUNTER — APPOINTMENT (OUTPATIENT)
Dept: URGENT CARE | Facility: HOSPITAL | Age: 84
End: 2019-01-01
Payer: COMMERCIAL

## 2019-01-01 ENCOUNTER — HOSPITAL ENCOUNTER (INPATIENT)
Facility: HOSPITAL | Age: 84
LOS: 1 days | DRG: 064 | End: 2019-09-27
Attending: EMERGENCY MEDICINE | Admitting: INTERNAL MEDICINE
Payer: COMMERCIAL

## 2019-01-01 ENCOUNTER — APPOINTMENT (EMERGENCY)
Dept: CT IMAGING | Facility: HOSPITAL | Age: 84
DRG: 064 | End: 2019-01-01
Payer: COMMERCIAL

## 2019-01-01 VITALS
HEART RATE: 62 BPM | WEIGHT: 169.75 LBS | TEMPERATURE: 99.2 F | BODY MASS INDEX: 25.07 KG/M2 | SYSTOLIC BLOOD PRESSURE: 125 MMHG | DIASTOLIC BLOOD PRESSURE: 61 MMHG | RESPIRATION RATE: 16 BRPM | OXYGEN SATURATION: 92 %

## 2019-01-01 DIAGNOSIS — Z79.01 LONG TERM (CURRENT) USE OF ANTICOAGULANTS: Primary | ICD-10-CM

## 2019-01-01 DIAGNOSIS — I48.92 ATRIAL FLUTTER, UNSPECIFIED TYPE (HCC): ICD-10-CM

## 2019-01-01 DIAGNOSIS — I48.91 ATRIAL FIBRILLATION, UNSPECIFIED TYPE (HCC): ICD-10-CM

## 2019-01-01 DIAGNOSIS — I48.91 ATRIAL FIBRILLATION, UNSPECIFIED TYPE (HCC): Primary | ICD-10-CM

## 2019-01-01 DIAGNOSIS — Z79.01 LONG TERM (CURRENT) USE OF ANTICOAGULANTS: ICD-10-CM

## 2019-01-01 DIAGNOSIS — R41.82 ALTERED MENTAL STATUS: Primary | ICD-10-CM

## 2019-01-01 DIAGNOSIS — I63.232 ACUTE ISCHEMIC LEFT ICA STROKE (HCC): ICD-10-CM

## 2019-01-01 DIAGNOSIS — I48.92 ATRIAL FLUTTER, UNSPECIFIED TYPE (HCC): Primary | ICD-10-CM

## 2019-01-01 LAB
ABO GROUP BLD: NORMAL
ALBUMIN SERPL BCP-MCNC: 4.1 G/DL (ref 3.5–5.7)
ALP SERPL-CCNC: 105 U/L (ref 55–165)
ALT SERPL W P-5'-P-CCNC: 16 U/L (ref 7–52)
ANION GAP SERPL CALCULATED.3IONS-SCNC: 7 MMOL/L (ref 4–13)
APTT PPP: 41 SECONDS (ref 23–37)
AST SERPL W P-5'-P-CCNC: 22 U/L (ref 13–39)
ATRIAL RATE: 52 BPM
ATRIAL RATE: 56 BPM
BACTERIA UR CULT: ABNORMAL
BACTERIA UR QL AUTO: ABNORMAL /HPF
BASOPHILS # BLD AUTO: 0.1 THOUSANDS/ΜL (ref 0–0.1)
BASOPHILS NFR BLD AUTO: 1 % (ref 0–2)
BILIRUB SERPL-MCNC: 0.8 MG/DL (ref 0.2–1)
BILIRUB UR QL STRIP: NEGATIVE
BLD GP AB SCN SERPL QL: NEGATIVE
BUN SERPL-MCNC: 27 MG/DL (ref 7–25)
CALCIUM SERPL-MCNC: 9.4 MG/DL (ref 8.6–10.5)
CHLORIDE SERPL-SCNC: 107 MMOL/L (ref 98–107)
CLARITY UR: ABNORMAL
CO2 SERPL-SCNC: 24 MMOL/L (ref 21–31)
COLOR UR: YELLOW
CREAT SERPL-MCNC: 1.42 MG/DL (ref 0.7–1.3)
EOSINOPHIL # BLD AUTO: 0.3 THOUSAND/ΜL (ref 0–0.61)
EOSINOPHIL NFR BLD AUTO: 3 % (ref 0–5)
ERYTHROCYTE [DISTWIDTH] IN BLOOD BY AUTOMATED COUNT: 14.6 % (ref 11.5–14.5)
ETHANOL SERPL-MCNC: <10 MG/DL
GFR SERPL CREATININE-BSD FRML MDRD: 42 ML/MIN/1.73SQ M
GLUCOSE SERPL-MCNC: 141 MG/DL (ref 65–99)
GLUCOSE UR STRIP-MCNC: NEGATIVE MG/DL
HCT VFR BLD AUTO: 42.9 % (ref 42–47)
HGB BLD-MCNC: 14.3 G/DL (ref 14–18)
HGB UR QL STRIP.AUTO: ABNORMAL
INR PPP: 2.01 (ref 0.84–1.19)
INR PPP: 2.04 (ref 0.9–1.5)
INR PPP: 2.05 (ref 0.9–1.5)
INR PPP: 2.05 (ref 0.9–1.5)
INR PPP: 2.17 (ref 0.9–1.5)
INR PPP: 2.18 (ref 0.9–1.5)
INR PPP: 2.24 (ref 0.84–1.19)
INR PPP: 2.42 (ref 0.9–1.5)
INR PPP: 2.42 (ref 0.9–1.5)
INR PPP: 2.55 (ref 0.84–1.19)
INR PPP: 2.62 (ref 0.84–1.19)
INR PPP: 2.8 (ref 0.84–1.19)
INR PPP: 2.81 (ref 0.9–1.5)
INR PPP: 2.9 (ref 0.84–1.19)
INR PPP: 2.93 (ref 0.9–1.5)
INR PPP: 2.96 (ref 0.84–1.19)
INR PPP: 2.99 (ref 0.84–1.19)
INR PPP: 3.52 (ref 0.9–1.5)
INR PPP: 3.77 (ref 0.84–1.19)
KETONES UR STRIP-MCNC: NEGATIVE MG/DL
LEUKOCYTE ESTERASE UR QL STRIP: ABNORMAL
LYMPHOCYTES # BLD AUTO: 3.1 THOUSANDS/ΜL (ref 0.6–4.47)
LYMPHOCYTES NFR BLD AUTO: 31 % (ref 21–51)
MCH RBC QN AUTO: 33.9 PG (ref 26–34)
MCHC RBC AUTO-ENTMCNC: 33.4 G/DL (ref 31–37)
MCV RBC AUTO: 102 FL (ref 81–99)
MONOCYTES # BLD AUTO: 1.3 THOUSAND/ΜL (ref 0.17–1.22)
MONOCYTES NFR BLD AUTO: 13 % (ref 2–12)
NEUTROPHILS # BLD AUTO: 5.1 THOUSANDS/ΜL (ref 1.4–6.5)
NEUTS SEG NFR BLD AUTO: 52 % (ref 42–75)
NITRITE UR QL STRIP: NEGATIVE
NON-SQ EPI CELLS URNS QL MICRO: ABNORMAL /HPF
OTHER STN SPEC: ABNORMAL
PH UR STRIP.AUTO: 5.5 [PH]
PLATELET # BLD AUTO: 247 THOUSANDS/UL (ref 149–390)
PMV BLD AUTO: 8.7 FL (ref 8.6–11.7)
POTASSIUM SERPL-SCNC: 4.4 MMOL/L (ref 3.5–5.5)
PROT SERPL-MCNC: 6.8 G/DL (ref 6.4–8.9)
PROT UR STRIP-MCNC: NEGATIVE MG/DL
PROTHROMBIN TIME: 22.3 SECONDS (ref 11.6–14.5)
PROTHROMBIN TIME: 23.8 SECONDS (ref 10.2–13)
PROTHROMBIN TIME: 23.8 SECONDS (ref 10.2–13)
PROTHROMBIN TIME: 24.1 SECONDS (ref 10.2–13)
PROTHROMBIN TIME: 24.3 SECONDS (ref 11.6–14.5)
PROTHROMBIN TIME: 25.2 SECONDS (ref 10.2–13)
PROTHROMBIN TIME: 25.3 SECONDS (ref 10.2–13)
PROTHROMBIN TIME: 26.9 SECONDS (ref 11.6–14.5)
PROTHROMBIN TIME: 27.5 SECONDS (ref 11.6–14.5)
PROTHROMBIN TIME: 28.1 SECONDS (ref 10.2–13)
PROTHROMBIN TIME: 28.6 SECONDS (ref 10.2–13)
PROTHROMBIN TIME: 29 SECONDS (ref 11.6–14.5)
PROTHROMBIN TIME: 29.8 SECONDS (ref 11.6–14.5)
PROTHROMBIN TIME: 30.3 SECONDS (ref 11.6–14.5)
PROTHROMBIN TIME: 30.5 SECONDS (ref 11.6–14.5)
PROTHROMBIN TIME: 32.6 SECONDS (ref 10.2–13)
PROTHROMBIN TIME: 34.6 SECONDS (ref 10.2–13)
PROTHROMBIN TIME: 36.7 SECONDS (ref 11.6–14.5)
PROTHROMBIN TIME: 41.4 SECONDS (ref 10.2–13)
QRS AXIS: -64 DEGREES
QRS AXIS: -69 DEGREES
QRSD INTERVAL: 122 MS
QRSD INTERVAL: 128 MS
QT INTERVAL: 536 MS
QT INTERVAL: 546 MS
QTC INTERVAL: 502 MS
QTC INTERVAL: 512 MS
RBC # BLD AUTO: 4.23 MILLION/UL (ref 4.3–5.9)
RBC #/AREA URNS AUTO: ABNORMAL /HPF
RH BLD: POSITIVE
SODIUM SERPL-SCNC: 138 MMOL/L (ref 134–143)
SP GR UR STRIP.AUTO: 1.02 (ref 1–1.03)
SPECIMEN EXPIRATION DATE: NORMAL
T WAVE AXIS: 70 DEGREES
T WAVE AXIS: 76 DEGREES
UROBILINOGEN UR QL STRIP.AUTO: 0.2 E.U./DL
VENTRICULAR RATE: 53 BPM
VENTRICULAR RATE: 53 BPM
WBC # BLD AUTO: 9.9 THOUSAND/UL (ref 4.8–10.8)
WBC #/AREA URNS AUTO: ABNORMAL /HPF

## 2019-01-01 PROCEDURE — 99231 SBSQ HOSP IP/OBS SF/LOW 25: CPT | Performed by: INTERNAL MEDICINE

## 2019-01-01 PROCEDURE — 36415 COLL VENOUS BLD VENIPUNCTURE: CPT

## 2019-01-01 PROCEDURE — 36415 COLL VENOUS BLD VENIPUNCTURE: CPT | Performed by: EMERGENCY MEDICINE

## 2019-01-01 PROCEDURE — 85610 PROTHROMBIN TIME: CPT

## 2019-01-01 PROCEDURE — 99223 1ST HOSP IP/OBS HIGH 75: CPT | Performed by: HOSPITALIST

## 2019-01-01 PROCEDURE — 86901 BLOOD TYPING SEROLOGIC RH(D): CPT | Performed by: EMERGENCY MEDICINE

## 2019-01-01 PROCEDURE — 87086 URINE CULTURE/COLONY COUNT: CPT | Performed by: EMERGENCY MEDICINE

## 2019-01-01 PROCEDURE — 93010 ELECTROCARDIOGRAM REPORT: CPT | Performed by: INTERNAL MEDICINE

## 2019-01-01 PROCEDURE — 85025 COMPLETE CBC W/AUTO DIFF WBC: CPT | Performed by: EMERGENCY MEDICINE

## 2019-01-01 PROCEDURE — 99285 EMERGENCY DEPT VISIT HI MDM: CPT

## 2019-01-01 PROCEDURE — 81001 URINALYSIS AUTO W/SCOPE: CPT | Performed by: EMERGENCY MEDICINE

## 2019-01-01 PROCEDURE — 70450 CT HEAD/BRAIN W/O DYE: CPT

## 2019-01-01 PROCEDURE — 99221 1ST HOSP IP/OBS SF/LOW 40: CPT | Performed by: INTERNAL MEDICINE

## 2019-01-01 PROCEDURE — 87106 FUNGI IDENTIFICATION YEAST: CPT | Performed by: EMERGENCY MEDICINE

## 2019-01-01 PROCEDURE — 96361 HYDRATE IV INFUSION ADD-ON: CPT

## 2019-01-01 PROCEDURE — 85730 THROMBOPLASTIN TIME PARTIAL: CPT | Performed by: EMERGENCY MEDICINE

## 2019-01-01 PROCEDURE — 81003 URINALYSIS AUTO W/O SCOPE: CPT | Performed by: EMERGENCY MEDICINE

## 2019-01-01 PROCEDURE — ND001 PR NO DOCUMENTATION: Performed by: NEUROLOGICAL SURGERY

## 2019-01-01 PROCEDURE — 93005 ELECTROCARDIOGRAM TRACING: CPT

## 2019-01-01 PROCEDURE — 96360 HYDRATION IV INFUSION INIT: CPT

## 2019-01-01 PROCEDURE — 85610 PROTHROMBIN TIME: CPT | Performed by: EMERGENCY MEDICINE

## 2019-01-01 PROCEDURE — 80320 DRUG SCREEN QUANTALCOHOLS: CPT | Performed by: EMERGENCY MEDICINE

## 2019-01-01 PROCEDURE — 99238 HOSP IP/OBS DSCHRG MGMT 30/<: CPT | Performed by: PHYSICIAN ASSISTANT

## 2019-01-01 PROCEDURE — 80053 COMPREHEN METABOLIC PANEL: CPT | Performed by: EMERGENCY MEDICINE

## 2019-01-01 PROCEDURE — 99233 SBSQ HOSP IP/OBS HIGH 50: CPT | Performed by: HOSPITALIST

## 2019-01-01 PROCEDURE — 86850 RBC ANTIBODY SCREEN: CPT | Performed by: EMERGENCY MEDICINE

## 2019-01-01 PROCEDURE — 86900 BLOOD TYPING SEROLOGIC ABO: CPT | Performed by: EMERGENCY MEDICINE

## 2019-01-01 RX ORDER — LORAZEPAM 2 MG/ML
1 INJECTION INTRAMUSCULAR EVERY 4 HOURS PRN
Status: DISCONTINUED | OUTPATIENT
Start: 2019-01-01 | End: 2019-01-01

## 2019-01-01 RX ORDER — ONDANSETRON 2 MG/ML
4 INJECTION INTRAMUSCULAR; INTRAVENOUS EVERY 6 HOURS PRN
Status: DISCONTINUED | OUTPATIENT
Start: 2019-01-01 | End: 2019-01-01 | Stop reason: HOSPADM

## 2019-01-01 RX ORDER — LORAZEPAM 2 MG/ML
1 INJECTION INTRAMUSCULAR
Status: DISCONTINUED | OUTPATIENT
Start: 2019-01-01 | End: 2019-09-30 | Stop reason: HOSPADM

## 2019-01-01 RX ORDER — SCOLOPAMINE TRANSDERMAL SYSTEM 1 MG/1
1 PATCH, EXTENDED RELEASE TRANSDERMAL
Status: DISCONTINUED | OUTPATIENT
Start: 2019-01-01 | End: 2019-09-30 | Stop reason: HOSPADM

## 2019-01-01 RX ORDER — SODIUM CHLORIDE 9 MG/ML
125 INJECTION, SOLUTION INTRAVENOUS ONCE
Status: DISCONTINUED | OUTPATIENT
Start: 2019-01-01 | End: 2019-01-01

## 2019-01-01 RX ORDER — LORAZEPAM 2 MG/ML
2 INJECTION INTRAMUSCULAR ONCE
Status: DISCONTINUED | OUTPATIENT
Start: 2019-01-01 | End: 2019-01-01

## 2019-01-01 RX ORDER — ASPIRIN 81 MG/1
81 TABLET, CHEWABLE ORAL DAILY
Status: DISCONTINUED | OUTPATIENT
Start: 2019-01-01 | End: 2019-01-01

## 2019-01-01 RX ORDER — SODIUM CHLORIDE 9 MG/ML
125 INJECTION, SOLUTION INTRAVENOUS CONTINUOUS
Status: DISCONTINUED | OUTPATIENT
Start: 2019-01-01 | End: 2019-01-01

## 2019-01-01 RX ORDER — ACETAMINOPHEN 650 MG/1
650 SUPPOSITORY RECTAL EVERY 4 HOURS PRN
Status: DISCONTINUED | OUTPATIENT
Start: 2019-01-01 | End: 2019-09-30 | Stop reason: HOSPADM

## 2019-01-01 RX ORDER — LORAZEPAM 2 MG/ML
1 INJECTION INTRAMUSCULAR
Status: DISCONTINUED | OUTPATIENT
Start: 2019-01-01 | End: 2019-01-01 | Stop reason: HOSPADM

## 2019-01-01 RX ORDER — ATORVASTATIN CALCIUM 40 MG/1
40 TABLET, FILM COATED ORAL EVERY EVENING
Status: DISCONTINUED | OUTPATIENT
Start: 2019-01-01 | End: 2019-01-01

## 2019-01-01 RX ORDER — SCOLOPAMINE TRANSDERMAL SYSTEM 1 MG/1
1 PATCH, EXTENDED RELEASE TRANSDERMAL
Status: DISCONTINUED | OUTPATIENT
Start: 2019-01-01 | End: 2019-01-01 | Stop reason: HOSPADM

## 2019-01-01 RX ADMIN — SODIUM CHLORIDE 125 ML/HR: 0.9 INJECTION, SOLUTION INTRAVENOUS at 03:36

## 2019-01-01 RX ADMIN — MORPHINE SULFATE 1 MG: 2 INJECTION, SOLUTION INTRAMUSCULAR; INTRAVENOUS at 18:06

## 2019-01-01 RX ADMIN — LORAZEPAM 1 MG: 2 INJECTION INTRAMUSCULAR; INTRAVENOUS at 23:42

## 2019-01-01 RX ADMIN — LORAZEPAM 1 MG: 2 INJECTION INTRAMUSCULAR; INTRAVENOUS at 01:49

## 2019-01-01 RX ADMIN — LORAZEPAM 1 MG: 2 INJECTION INTRAMUSCULAR; INTRAVENOUS at 05:34

## 2019-01-01 RX ADMIN — MORPHINE SULFATE 2 MG: 2 INJECTION, SOLUTION INTRAMUSCULAR; INTRAVENOUS at 07:54

## 2019-01-01 RX ADMIN — MORPHINE SULFATE 2 MG: 2 INJECTION, SOLUTION INTRAMUSCULAR; INTRAVENOUS at 01:51

## 2019-01-01 RX ADMIN — LORAZEPAM 1 MG: 2 INJECTION INTRAMUSCULAR; INTRAVENOUS at 13:59

## 2019-01-01 RX ADMIN — MORPHINE SULFATE 1 MG: 2 INJECTION, SOLUTION INTRAMUSCULAR; INTRAVENOUS at 13:10

## 2019-01-01 RX ADMIN — MORPHINE SULFATE 1 MG: 2 INJECTION, SOLUTION INTRAMUSCULAR; INTRAVENOUS at 15:26

## 2019-01-01 RX ADMIN — SCOPALAMINE 1 PATCH: 1 PATCH, EXTENDED RELEASE TRANSDERMAL at 06:55

## 2019-01-01 RX ADMIN — LORAZEPAM 1 MG: 2 INJECTION INTRAMUSCULAR; INTRAVENOUS at 22:34

## 2019-01-01 RX ADMIN — MORPHINE SULFATE 2 MG: 2 INJECTION, SOLUTION INTRAMUSCULAR; INTRAVENOUS at 05:34

## 2019-01-01 RX ADMIN — LORAZEPAM 1 MG: 2 INJECTION INTRAMUSCULAR; INTRAVENOUS at 07:36

## 2019-01-01 RX ADMIN — LORAZEPAM 1 MG: 2 INJECTION INTRAMUSCULAR; INTRAVENOUS at 05:06

## 2019-01-01 RX ADMIN — MORPHINE SULFATE 2 MG: 2 INJECTION, SOLUTION INTRAMUSCULAR; INTRAVENOUS at 06:35

## 2019-01-01 RX ADMIN — LORAZEPAM 1 MG: 2 INJECTION INTRAMUSCULAR; INTRAVENOUS at 06:35

## 2019-01-01 RX ADMIN — LORAZEPAM 1 MG: 2 INJECTION INTRAMUSCULAR; INTRAVENOUS at 18:07

## 2019-01-01 RX ADMIN — MORPHINE SULFATE 1 MG: 2 INJECTION, SOLUTION INTRAMUSCULAR; INTRAVENOUS at 08:25

## 2019-01-01 RX ADMIN — MORPHINE SULFATE 1 MG: 2 INJECTION, SOLUTION INTRAMUSCULAR; INTRAVENOUS at 10:34

## 2019-01-01 RX ADMIN — LORAZEPAM 1 MG: 2 INJECTION INTRAMUSCULAR; INTRAVENOUS at 08:47

## 2019-01-01 RX ADMIN — MORPHINE SULFATE 2 MG: 2 INJECTION, SOLUTION INTRAMUSCULAR; INTRAVENOUS at 18:00

## 2019-01-01 RX ADMIN — MORPHINE SULFATE 2 MG: 2 INJECTION, SOLUTION INTRAMUSCULAR; INTRAVENOUS at 00:50

## 2019-01-01 RX ADMIN — LORAZEPAM 1 MG: 2 INJECTION INTRAMUSCULAR; INTRAVENOUS at 13:10

## 2019-01-01 RX ADMIN — MORPHINE SULFATE 2 MG: 2 INJECTION, SOLUTION INTRAMUSCULAR; INTRAVENOUS at 20:28

## 2019-01-01 RX ADMIN — MORPHINE SULFATE 2 MG: 2 INJECTION, SOLUTION INTRAMUSCULAR; INTRAVENOUS at 23:42

## 2019-01-01 RX ADMIN — SCOPALAMINE 1 PATCH: 1 PATCH, EXTENDED RELEASE TRANSDERMAL at 20:05

## 2019-01-01 RX ADMIN — MORPHINE SULFATE 2 MG: 2 INJECTION, SOLUTION INTRAMUSCULAR; INTRAVENOUS at 21:21

## 2019-01-01 RX ADMIN — MORPHINE SULFATE 2 MG: 2 INJECTION, SOLUTION INTRAMUSCULAR; INTRAVENOUS at 10:27

## 2019-01-01 RX ADMIN — MORPHINE SULFATE 2 MG: 2 INJECTION, SOLUTION INTRAMUSCULAR; INTRAVENOUS at 20:05

## 2019-01-01 RX ADMIN — MORPHINE SULFATE 2 MG: 2 INJECTION, SOLUTION INTRAMUSCULAR; INTRAVENOUS at 05:05

## 2019-01-01 RX ADMIN — LORAZEPAM 1 MG: 2 INJECTION INTRAMUSCULAR; INTRAVENOUS at 18:00

## 2019-01-01 RX ADMIN — LORAZEPAM 1 MG: 2 INJECTION INTRAMUSCULAR; INTRAVENOUS at 00:50

## 2019-01-01 RX ADMIN — LORAZEPAM 1 MG: 2 INJECTION INTRAMUSCULAR; INTRAVENOUS at 15:45

## 2019-01-01 RX ADMIN — ACETAMINOPHEN 650 MG: 650 SUPPOSITORY RECTAL at 18:00

## 2019-01-01 RX ADMIN — MORPHINE SULFATE 1 MG: 2 INJECTION, SOLUTION INTRAMUSCULAR; INTRAVENOUS at 14:12

## 2019-01-01 RX ADMIN — LORAZEPAM 1 MG: 2 INJECTION INTRAMUSCULAR; INTRAVENOUS at 01:55

## 2019-01-01 RX ADMIN — MORPHINE SULFATE 2 MG: 2 INJECTION, SOLUTION INTRAMUSCULAR; INTRAVENOUS at 01:55

## 2019-01-01 RX ADMIN — MORPHINE SULFATE 2 MG: 2 INJECTION, SOLUTION INTRAMUSCULAR; INTRAVENOUS at 04:27

## 2019-01-01 RX ADMIN — LORAZEPAM 1 MG: 2 INJECTION INTRAMUSCULAR; INTRAVENOUS at 10:27

## 2019-01-01 RX ADMIN — MORPHINE SULFATE 1 MG: 2 INJECTION, SOLUTION INTRAMUSCULAR; INTRAVENOUS at 12:03

## 2019-01-01 RX ADMIN — MORPHINE SULFATE 1 MG: 2 INJECTION, SOLUTION INTRAMUSCULAR; INTRAVENOUS at 06:27

## 2019-01-01 RX ADMIN — LORAZEPAM 1 MG: 2 INJECTION INTRAMUSCULAR; INTRAVENOUS at 20:28

## 2019-01-01 RX ADMIN — LORAZEPAM 1 MG: 2 INJECTION INTRAMUSCULAR; INTRAVENOUS at 15:23

## 2019-01-01 RX ADMIN — MORPHINE SULFATE: 2 INJECTION, SOLUTION INTRAMUSCULAR; INTRAVENOUS at 17:30

## 2019-01-01 RX ADMIN — LORAZEPAM 1 MG: 2 INJECTION INTRAMUSCULAR; INTRAVENOUS at 04:26

## 2019-01-01 RX ADMIN — MORPHINE SULFATE 1 MG: 2 INJECTION, SOLUTION INTRAMUSCULAR; INTRAVENOUS at 01:32

## 2019-01-01 RX ADMIN — MORPHINE SULFATE 1 MG: 2 INJECTION, SOLUTION INTRAMUSCULAR; INTRAVENOUS at 17:18

## 2019-01-01 RX ADMIN — MORPHINE SULFATE 2 MG: 2 INJECTION, SOLUTION INTRAMUSCULAR; INTRAVENOUS at 17:29

## 2019-01-01 RX ADMIN — LORAZEPAM 1 MG: 2 INJECTION INTRAMUSCULAR; INTRAVENOUS at 21:22

## 2019-01-01 RX ADMIN — MORPHINE SULFATE 1 MG: 2 INJECTION, SOLUTION INTRAMUSCULAR; INTRAVENOUS at 20:04

## 2019-01-01 RX ADMIN — MORPHINE SULFATE 2 MG: 2 INJECTION, SOLUTION INTRAMUSCULAR; INTRAVENOUS at 22:33

## 2019-01-01 RX ADMIN — MORPHINE SULFATE 1 MG: 2 INJECTION, SOLUTION INTRAMUSCULAR; INTRAVENOUS at 18:27

## 2019-01-01 RX ADMIN — MORPHINE SULFATE 2 MG: 2 INJECTION, SOLUTION INTRAMUSCULAR; INTRAVENOUS at 15:23

## 2019-09-26 PROBLEM — I63.232 ACUTE ISCHEMIC LEFT ICA STROKE (HCC): Status: ACTIVE | Noted: 2019-01-01

## 2019-09-26 PROBLEM — G93.40 ACUTE ENCEPHALOPATHY: Status: ACTIVE | Noted: 2019-01-01

## 2019-09-26 NOTE — SOCIAL WORK
Chart reviewed by case management, pt was in ICU on a nrb, pt was made comfort care and transferred to the med/surg floor, I met with the family to see if they had any needs, their  had just been with the pt, hospitality tray was given, cm will continue to follow

## 2019-09-26 NOTE — H&P
H&P- Bhakti Butler 3/4/1924, 80 y o  male MRN: [id number]    Unit/Bed#: ICU 01 Encounter: [id number]    Primary Care Provider: ELDER Morrison   Date and time admitted to hospital: 9/26/2019  3:09 AM    Addendum: 4428: spoke with family and they would like to pursue comfort care at this time  Will cancel MRI, echo, statin  Lorazepam, Morphine ordered  * Acute ischemic left ICA stroke (HCC)  Assessment & Plan  · Patient found on toilet, unknown time last well, approx 10pm per wife they went to bed  · CT head: Left hyperdense MCA sinus concerning for stroke  · Case reviewed by Neurology, recommend ICU admit, check MRI brain  If no improvement, hospice indicated  · Case was also reviewed with Neuro intervention by ER attending  · If improves will need PT/OT/Speech evals  · Hold aspirin for now per Neuro  · Statin if able to take PO  · Patient is DNR/DNI per discussion with daughter and wife at bedside    Acute encephalopathy  Assessment & Plan  · Secondary to stroke  · Supportive care    Hypothyroidism  Assessment & Plan  · Levothyroxine 100mcg    Essential hypertension  Assessment & Plan  · Permissive HTN  · Hold metoprolol 12 5mg    Atrial flutter (HCC)  Assessment & Plan  · Plan is to reverse coumadin per discussion with Neurology   · Check INR at 11am    VTE Prophylaxis: Warfarin (Coumadin)  Code Status: DNR/DNI  POLST: POLST is not applicable to this patient  Discussion with family: wife and daughter at bedside  Discussed likely poor outcome and may need to pursue hospice    Anticipated Length of Stay:  Patient will be admitted on an Inpatient basis with an anticipated length of stay of  > 2 midnights  Justification for Hospital Stay: acute CVA      Chief Complaint:   Altered mental status    History of Present Illness:    Bhakti Butler is a 80 y o  male who presents with altered mental status    Patient with PMHx of atrial flutter with prior CVA age 80 on coumadin, HTN, hypothyroidism was brought to ER secondary to altered mental status  Per patients wife, he went to bed at 10 pm and around 0130 am got up to go to the bathroom  She called to him and asked if he was ok and had no answer and when she checked on him found him slumped on the toilet  EMS noted episodes of posturing movement and agonal breathing with occasional periods of apnea  He was suctioned in ER secondary to secretions  ER attending spoke w/ Tele Neuro and Neuro interventional specialist, was not a candidate for any procedure  He was recommended to obtain MRI and evaluate for any change with patient status  Also to reverse his coumadin and no aspirin at this time  Review of Systems:  Review of Systems   Unable to perform ROS: Acuity of condition       Past Medical and Surgical History:   Past Medical History:   Diagnosis Date    [facility]     [facility] flutter ([facility] )     Disease of thyroid gland     Hypertension     Stroke [facility])        Past Surgical History:   Procedure Laterality Date    CATARACT EXTRACTION, BILATERAL      JOINT REPLACEMENT Right        Meds/Allergies:  Prior to Admission medications    Medication Sig Start Date End Date Taking? Authorizing Provider   levothyroxine 100 mcg tablet Take by mouth    Historical Provider, MD   metoprolol tartrate (LOPRESSOR) 25 mg tablet Take 12 5 mg by mouth 8/27/18   Historical Provider, MD   warfarin (COUMADIN) 2 5 mg tablet Take 2 5 mg by mouth 6/13/18   Historical Provider, MD     I have reviewed home medications with patient family member      Allergies: No Known Allergies    Social History:  Marital Status: /Civil Union   Occupation: retired  Patient Pre-hospital Living Situation: home  Patient Pre-hospital Level of Mobility: no assisted devices used  Patient Pre-hospital Diet Restrictions: none  Substance Use History:     Social History     Substance and Sexual Activity   Alcohol Use No     Social History     Tobacco Use   Smoking Status Never Smoker   Smokeless Tobacco Never Used     Social History     Substance and Sexual Activity   Drug Use No       Family History:  I have reviewed the patients family history    Physical Exam:   Vitals:   Blood Pressure: (!) 172/82 (09/26/19 0515)  Pulse: (!) 52 (09/26/19 0515)  Temperature: (!) 97 4 °F (36 3 °C) (09/26/19 0245)  Respirations: 20 (09/26/19 0515)  Weight - Scale: 77 kg (169 lb 12 1 oz) (09/26/19 0530)  SpO2: 100 % (09/26/19 0515)    Physical Exam   Constitutional: He appears well-developed and well-nourished  Face mask in place  HENT:   Head: Normocephalic and atraumatic  Eyes: Conjunctivae are normal  Right eye exhibits no discharge  Left eye exhibits no discharge  Neck: No tracheal deviation present  Cardiovascular: Regular rhythm and normal heart sounds  Bradycardia present  Exam reveals no gallop and no friction rub  No murmur heard  Pulmonary/Chest: No respiratory distress  He has no wheezes  He has no rales  Decreased breath sounds bilaterally   Abdominal: Soft  Bowel sounds are normal  He exhibits no distension and no mass  There is no tenderness  There is no guarding  Musculoskeletal: He exhibits no edema, tenderness or deformity  Neurological: He is unresponsive  Eyes pulsing side to side  Some movements of extremities - spontaneous, resisted some movement  NIHSS 24     Skin: Skin is warm and dry  No rash noted  No erythema  No pallor  Healed scar right knee   Psychiatric: He has a normal mood and affect  His behavior is normal  Judgment and thought content normal    Nursing note and vitals reviewed  [facility] Stroke Scale (NIHSS)          1a  Level of  Consciousness (LOC) 0 = Alert, keenly responsive  1 = Not alert, but arousable by minor        stimulation  2 = Not alert, required repeated stimulation to         attend  3 = Responds only with reflex motor or totally         unresponsive       1a   3   1b    LOC Questions  Asked to say month and his/her age 0 = Answers both questions correctly  1 = Answers one question correctly        (dysarthric, intubated)  2 = Answers neither question correctly        (aphasic, stupor)  1b   2   1c  LOC Commands  Asked to open & close eyes, then  & release with non-affected hand  0 = Performs both tasks correctly  1 = Performs one task correctly  2 = Performs neither task correctly  1c   2     2  Best Gaze  Asked to follow with eyes through horizontal plane  0 = Normal  1 = Partial gaze palsy  2 = Forced deviation or total gaze paresis  2   0   3  Visual  Visual fields (quadrants) tested with finger counting or visual threat  0 = No visual loss  1 = Partial hemianopia (extinction)  2 = Complete hemianopia  3 = Bilateral hemianopia (including         blindness)  3   0   4  Facial Palsy  Asked to show teeth & raise eyebrows  0 = Normal symmetrical movement  1 = Minor paralysis  2 = Partial paralysis (total/near total         paralysis of lower face)  3 = Complete paralysis of one or both         sides (upper & lower)  4   0   5  Motor Arm  Asked to extend arms (palm down) 90º (if sitting) or 45º (if supine) & hold for 10 seconds  0 = No drift; arm stays at 90º/45º for full 10        seconds  1 = Drift; arm drifts down but does not hit         bed or other support  2 = Some effort against gravity; drifts down         to bed or support  3 = No effort against gravity: arm falls to         bed or support  4 = No movement  9 = Amputation, joint fusion  5a  (Left)       2      5b  (Right)        2    6  Motor Leg  While supine, asked to hold leg at 30º for 5 seconds  0 = No drift; leg stays at 30º for full 5        seconds  1 = Drift; leg drifts down but does not hit         the bed or other support  2 = Some effort against gravity; drifts down         to bed or support  3 = No effort against gravity; leg falls to         bed or support  4 = No movement    9 = Amputation, joint fusion  6a   (Left)       2        6b  (Right)       2   7  Limb Ataxia  Finger - nose & heel shin tests on both sides  0 = Absent  1 = Present in one limb  2 = Present in two limbs  7   2   8  Sensory  Sensation or grimace to pin prick or withdrawal from noxious stimuli in obtunded or aphasic patient   0 = Normal; no sensory loss  1 = Mild / moderate sensory loss; may be        dulled / "not as sharp"  2 = Severe / total sensory loss; coma     8   0   9  Best Language  Asked to describe a picture, name objects & read simple words  (See NIHSS language tools)  0 = No aphasia; normal  1 = Mild / moderate aphasia; some loss of        fluency / comprehension without        limitation of expression of ideas (can        identify picture from patient's        responses)  2 = Severe aphasia (cannot identify         pictures from responses)  3 = Mute; global aphasia; no usable        speech; cannot follow simple        commands  9   3   10  Dysarthria   0 = Normal   1 = Mild / moderate; slurs some words;         can be understood  2 = Severe; so slurred as to be         unintelligible; mute;anarthric     9 = Intubated or other physical barrier  10   3   11  Extinction & Inattention  Look at Visual (from #3) and double simultaneous tactile     0 = No abnormality  1 = Inattention or extinction in one sensory         modality  2 = Profound ap[place] inattention or        inattention to more than one modality;        does not recognize own hand; orients        to only one side of space  11   1     Complete NIHSS Score (0-42): 24         Additional Data:   Lab Results: I have personally reviewed pertinent reports        Results from last 7 days   Lab Units 09/26/19  0251   WBC Thousand/uL 9 90   HEMOGLOBIN g/dL 14 3   HEMATOCRIT % 42 9   PLATELETS Thousands/uL 247   NEUTROS PCT % 52   LYMPHS PCT % 31   MONOS PCT % 13*   EOS PCT % 3     Results from last 7 days   Lab Units 09/26/19  0251   POTASSIUM mmol/L 4 4   CHLORIDE mmol/L 107   CO2 mmol/L 24   BUN mg/dL 27*   CREATININE mg/dL 1 42*   CALCIUM mg/dL 9 4   ALK PHOS U/L 105   ALT U/L 16   AST U/L 22     Results from last 7 days   Lab Units 09/26/19  0251   INR  3 52*       Imaging: I have personally reviewed pertinent reports  CT head without contrast   Final Result by Norma Weir MD (09/26 0335)      Left hyperdense MCA sinus concerning for stroke  I personally discussed this study with Gopal Maravilla on 9/26/2019 at 3:33 AM                Workstation performed: [id number]         MRI Inpatient Order    (Results Pending)       NetAccess/Epic Records Reviewed: Yes     ** Please Note: This note has been constructed using a voice recognition system   **

## 2019-09-26 NOTE — PLAN OF CARE
Problem: DISCHARGE PLANNING - CARE MANAGEMENT  Goal: Discharge to post-acute care or home with appropriate resources  Description  INTERVENTIONS:  - Conduct assessment to determine patient/family and health care team treatment goals, and need for post-acute services based on payer coverage, community resources, and patient preferences, and barriers to discharge  - Address psychosocial, clinical, and financial barriers to discharge as identified in assessment in conjunction with the patient/family and health care team  - Arrange appropriate level of post-acute services according to patients   needs and preference and payer coverage in collaboration with the physician and health care team  - Communicate with and update the patient/family, physician, and health care team regarding progress on the discharge plan  - Arrange appropriate transportation to post-acute venues  Outcome: Progressing

## 2019-09-26 NOTE — ED NOTES
Placed on monitor, pulse ox on arrival  fsbs 124  100% BVM assisting resp-intermittant apneic periods per RT        Nuzhat Russell RN  09/26/19 3814

## 2019-09-26 NOTE — NURSING NOTE
Pt received from ed  Pt moved to the bed x 4 assist  Pt is unresponsive with irregular resps  All monitors attached  Pt is on 100% nrb spo2 is 100%  nt suctioned pt for thick clear blood tinged secretions  Family at bedside discussed pt condition and decision made to make pt comfortable  hospitalist notified and pt made comfort care  Report given to next shift

## 2019-09-26 NOTE — ED NOTES
Family at bedside  Wife reports patient had walked to bathroom at 0139       Nneka Payton RN  09/26/19 2742

## 2019-09-26 NOTE — UTILIZATION REVIEW
Initial Clinical Review    Admission: Date/Time/Statement: Inpatient Admission Orders (From admission, onward)     Ordered        09/26/19 0513  Inpatient Admission (expected length of stay for this patient Order details is greater than two midnights)  Once                   Orders Placed This Encounter   Procedures    Inpatient Admission (expected length of stay for this patient Order details is greater than two midnights)     Standing Status:   Standing     Number of Occurrences:   1     Order Specific Question:   Admitting Physician     Answer:   Maria G Devi [[id number]]     Order Specific Question:   Level of Care     Answer:   Critical Care [15]     Order Specific Question:   Estimated length of stay     Answer:   More than 2 Midnights     Order Specific Question:   Certification     Answer:   I certify that inpatient services are medically necessary for this patient for a duration of greater than two midnights  See H&P and MD Progress Notes for additional information about the patient's course of treatment  ED Arrival Information     Expected Arrival Acuity Means of Arrival Escorted By Service Admission Type    - 9/26/2019 02:36 Emergent [facility] Emergency    Arrival Complaint    Unresponsive        Chief Complaint   Patient presents with    Altered Mental Status     found in bathroom unresponsive by wife at approx 2 am       Assessment/Plan:   81 yo male,  To ER from home  via EMS,  Admitted in @ [facility] level of care,  For comfort care in setting of acute stroke  Wife reports Last seen normal at 10 pm  Around 0130, patient went to bathroom and did not return to bed; Wife found him unresponsive in bathroom  IN ER,  Remains unresponsive,  Intermittent posturing movements ,  All extremities fall to the ground equally upon passive elevation  Patient is not an IV tpa candidate given inr of 3 5, on coumadin  GCS 6   Ct head concerning for hyperdense left mca, pt is normally well functioning  cta head/neck can be done later if renal function improves  Pt should be admitted to ICU at [address] and INR reversed with vitamin k and ffp  (family later declined this intervention)    sbp 160-200  Pressors if needed  Mri brain w/o contrast  Tele  If no clinical improvement patient may need eventual comfort measures  9/26  @  0919 -  Pt transferred to [facility] floor, unresponsive  For comfort care per family wishes  9/26  @  1310 -  IV ativan x2  9/26 IV MV 1 mg given x5 (last dose 1412)       ED Triage Vitals   Temperature Pulse Respirations Blood Pressure SpO2   09/26/19 0245 09/26/19 0245 09/26/19 0245 09/26/19 0245 09/26/19 0245   (!) 97 4 °F (36 3 °C) (!) 53 20 152/83 98 %      Temp Source Heart Rate Source Patient Position - Orthostatic VS BP Location FiO2 (%)   09/26/19 0600 09/26/19 0600 09/26/19 0600 09/26/19 0600 --   Temporal Monitor Lying Right arm       Pain Score       09/26/19 0623       No Pain        Wt Readings from Last 1 Encounters:   09/26/19 77 kg (169 lb 12 1 oz)     Additional Vital Signs:   09/26/19 0800  --  61  23Abnormal   165/73  105  100 %  --  --   09/26/19 0740  100 °F   54Abnormal   23Abnormal   164/72               Pertinent Labs/Diagnostic Test Results:     9/26  CT head -  Left hyperdense MCA sinus concerning for stroke      Results from last 7 days   Lab Units 09/26/19  0251   WBC Thousand/uL 9 90   HEMOGLOBIN g/dL 14 3   HEMATOCRIT % 42 9   PLATELETS Thousands/uL 247   NEUTROS ABS Thousands/µL 5 10         Results from last 7 days   Lab Units 09/26/19  0251   SODIUM mmol/L 138   POTASSIUM mmol/L 4 4   CHLORIDE mmol/L 107   CO2 mmol/L 24   ANION GAP mmol/L 7   BUN mg/dL 27*   CREATININE mg/dL 1 42*   EGFR ml/min/1 73sq m 42   CALCIUM mg/dL 9 4     Results from last 7 days   Lab Units 09/26/19  0251   AST U/L 22   ALT U/L 16   ALK PHOS U/L 105   TOTAL PROTEIN g/dL 6 8   ALBUMIN g/dL 4 1   TOTAL BILIRUBIN mg/dL 0 80         Results from last 7 days   Lab Units 09/26/19  0251   GLUCOSE RANDOM mg/dL 141*     Results from last 7 days   Lab Units 09/26/19  0251   PROTIME seconds 41 4*   INR  3 52*   PTT seconds 41*     Results from last 7 days   Lab Units 09/26/19  0522   CLARITY UA  Slightly Cloudy*   COLOR UA  Yellow   SPEC GRAV UA  1 020   PH UA  5 5   GLUCOSE UA mg/dl Negative   KETONES UA mg/dl Negative   BLOOD UA  2+*   PROTEIN UA mg/dl Negative   NITRITE UA  Negative   BILIRUBIN UA  Negative   UROBILINOGEN UA E U /dl 0 2   LEUKOCYTES UA  3+*   WBC UA /hpf Innumerable*   RBC UA /hpf 4-10*   BACTERIA UA /hpf None Seen   EPITHELIAL CELLS WET PREP /hpf Occasional     Results from last 7 days   Lab Units 09/26/19  0328   ETHANOL LVL mg/dL <10     ED Treatment:   Medication Administration from 09/26/2019 0236 to 09/26/2019 0600       Date/Time Order Dose Route     09/26/2019 0336 sodium chloride 0 9 % infusion 125 mL/hr Intravenous        Past Medical History:   Diagnosis Date    Atrial fibrillation ([facility] )     Atrial flutter ([facility] )     Disease of thyroid gland     Hypertension     Stroke [facility])      Present on Admission:   [facility]CA [facility]   Acute encephalopathy   [facility] fl[facility]   Essential hypertension   Hypothyroidism      Admitting Diagnosis: Altered mental status [R41 82]  Acute ischemic left ICA stroke ([facility] ) [I63 232]  Age/Sex: 80 y o  male     Admission Orders: Allow permissive HTN,  Hold metoprolol,  NPO; Telemetry;  VS + NEURO CHECKS Q1HR X 4 - Q4HRS; Case mgt and neurology consult      Network Utilization Review Department  Phone: [phone]; Fax [phone]  [email or web address]  ATTENTION: Please call with any questions or concerns to [phone]  and carefully listen to the prompts so that you are directed to the right person     Send all requests for admission clinical reviews, approved or denied determinations and any other requests to fax [phone]   All voicemails are confidential

## 2019-09-26 NOTE — PROGRESS NOTES
Called regarding Mr Eva Shabazz from [facility] ER  Stroke alert was not initiated by [facility] but CT concerning for left ICA terminus stroke  Per ED he is GCS6 with posturing and LKW 10PM  Family is hesitant for agressive measures and patient is DNR  Based on current exam and advanced directives, likely not candidate, however, should family be interested in further evaluation could consider transfer to CTP  If interested he will require formal neurology evaluation for NIHSS  Not tpa candidate 2/2 coumadin

## 2019-09-26 NOTE — PLAN OF CARE
Problem: Prexisting or High Potential for Compromised Skin Integrity  Goal: Skin integrity is maintained or improved  Description  INTERVENTIONS:  - Identify patients at risk for skin breakdown  - Assess and monitor skin integrity  - Assess and monitor nutrition and hydration status  - Monitor labs   - Assess for incontinence   - Turn and reposition patient  - Assist with mobility/ambulation  - Relieve pressure over bony prominences  - Avoid friction and shearing  - Provide appropriate hygiene as needed including keeping skin clean and dry  - Evaluate need for skin moisturizer/barrier cream  - Collaborate with interdisciplinary team   - Patient/family teaching  - Consider wound care consult   Outcome: Progressing     Problem: CARDIOVASCULAR - ADULT  Goal: Maintains optimal cardiac output and hemodynamic stability  Description  INTERVENTIONS:  - Monitor I/O, vital signs and rhythm  - Monitor for S/S and trends of decreased cardiac output  - Administer and titrate ordered vasoactive medications to optimize hemodynamic stability  - Assess quality of pulses, skin color and temperature  - Assess for signs of decreased coronary artery perfusion  - Instruct patient to report change in severity of symptoms  Outcome: Progressing  Goal: Absence of cardiac dysrhythmias or at baseline rhythm  Description  INTERVENTIONS:  - Continuous cardiac monitoring, vital signs, obtain 12 lead EKG if ordered  - Administer antiarrhythmic and heart rate control medications as ordered  - Monitor electrolytes and administer replacement therapy as ordered  Outcome: Progressing     Problem: SKIN/TISSUE INTEGRITY - ADULT  Goal: Skin integrity remains intact  Description  INTERVENTIONS  - Identify patients at risk for skin breakdown  - Assess and monitor skin integrity  - Assess and monitor nutrition and hydration status  - Monitor labs (i e  albumin)  - Assess for incontinence   - Turn and reposition patient  - Assist with mobility/ambulation  - Relieve pressure over bony prominences  - Avoid friction and shearing  - Provide appropriate hygiene as needed including keeping skin clean and dry  - Evaluate need for skin moisturizer/barrier cream  - Collaborate with interdisciplinary team (i e  Nutrition, Rehabilitation, etc )   - Patient/family teaching  Outcome: Progressing  Goal: Oral mucous membranes remain intact  Description  INTERVENTIONS  - Assess oral mucosa and hygiene practices  - Implement preventative oral hygiene regimen  - Implement oral medicated treatments as ordered  - Initiate Nutrition services referral as needed  Outcome: Progressing     Problem: COPING  Goal: Pt/Family able to verbalize concerns and demonstrate effective coping strategies  Description  INTERVENTIONS:  - Assist patient/family to identify coping skills, available support systems and cultural and spiritual values  - Provide emotional support, including active listening and acknowledgement of concerns of patient and caregivers  - Reduce environmental stimuli, as able  - Provide patient education  - Assess for spiritual pain/suffering and initiate spiritual care, including notification of Pastoral Care or vitaliy based community as needed  - Assess effectiveness of coping strategies  Outcome: Progressing  Goal: Will report anxiety at manageable levels  Description  INTERVENTIONS:  - Administer medication as ordered  - Teach and encourage coping skills  - Provide emotional support  - Assess patient/family for anxiety and ability to cope  Outcome: Progressing

## 2019-09-26 NOTE — NURSING NOTE
Pt maintained on comfort care, morphine and ativan given for mild restlessness and increased respirations  Family at bedside  Will continue to monitor

## 2019-09-26 NOTE — NURSING NOTE
Pt transferred to the Med Surg floor unresponsive, no movement to pain, rhonci present upon ascultation, respiratory rate is WDL  Oxygen and IV discontinued for comfort care, russell catheter maintained  Family educated on available pain medication  Will continue to monitor

## 2019-09-26 NOTE — QUICK NOTE
Stroke alert 4:20 am  Neurology connected at 4:20 am through PACS  NIH stroke scale 21  Last normal at 10 pm  Around 1, 1:30 pm patient got up to go to the bathroom and wife went to look for him as he did not return and found him unresponsive  Per my conversation with ED team patient is nonverbal, not following commands  Intermittent posturing movements of all extremities  All extremities fall to the ground equally upon passive elevation  No seizure like activity noted  Patient is not an IV tpa candidate given inr of 3 5, on coumadin  NIH stroke scale yet to be calculated  GCS 6  Ct head concerning for hyperdense left mca, pt is normally well functioning as he does not drive  Creatinine 1 42, last creatinine 1 26 1 year ago  cta head/neck not done and can be done later if renal function improves  Given that patient does not appear to be an interventional candidate not urgent at this given time  I've discussed case with ED attending  Family hesitant for aggressive measures and given the current exam does not appear to be candidate for endovascular intervention  Pt should be admitted to ICU at [facility] and INR reversed with vitamin k and ffp   sbp 160-200  Pressors if needed  Mri brain w/o contrast  Tele  If no clinical improvement patient may need eventual comfort measures  Decide for official neurology consult based on future clinical exams

## 2019-09-26 NOTE — ED PROVIDER NOTES
History  Chief Complaint   Patient presents with    Altered Mental Status     found in bathroom unresponsive by wife at approx 2 am       PATIENT BROUGHT BY EMS FROM HOME, PATIENT FOUND UNRESPONSIVE ON THE TOILET BY HIS WIFE  MULTIPLE REQUESTS TO ASCERTAIN WHEN THE PATIENT WAS LAST SEEN NORMAL WAS ANSWERED BY A AN UNKNOWN TIME  FAMILY MEMBERS PRESENT IN THE EMERGENCY ROOM LOBBY INDICATE TO ME THAT THE PATIENT DID NOT WANT TO BE INTUBATED  SHORTLY THEREAFTER SIGNED ADVANCED DIRECTIVES CONFIRMED THE PATIENT DID NOT WANT INTUBATION OR CPR  REGARDING HIS TRANSPORT, EMS NOTED THAT HE HAD PAROXYSMAL EPISODES OF POSTURING MOVEMENT  BUT NO RESPONSE TO COMMANDS  IN ADDITION ALTHOUGH HIS O2 SATURATION WAS SATISFACTORY ON ROOM AIR THE PATIENT EXHIBITED AGONAL BREATHING WITH OCCASIONAL PERIODS OF APNEA  Prior to Admission Medications   Prescriptions Last Dose Informant Patient Reported? Taking?   levothyroxine 100 mcg tablet   Yes No   Sig: Take by mouth   metoprolol tartrate (LOPRESSOR) 25 mg tablet   Yes No   Sig: Take 12 5 mg by mouth   warfarin (COUMADIN) 2 5 mg tablet   Yes No   Sig: Take 2 5 mg by mouth      Facility-Administered Medications: None       Past Medical History:   Diagnosis Date    Atrial fibrillation (HCC)     Atrial flutter (HCC)     Disease of thyroid gland     Hypertension     Stroke [facility])        Past Surgical History:   Procedure Laterality Date    CATARACT EXTRACTION, BILATERAL      JOINT REPLACEMENT Right        History reviewed  No pertinent family history  I have reviewed and agree with the history as documented  Social History     Tobacco Use    Smoking status: Never Smoker    Smokeless tobacco: Never Used   Substance Use Topics    Alcohol use: No    Drug use: No        Review of Systems   Unable to perform ROS: Mental status change       Physical Exam  Physical Exam   HENT:   Head: Normocephalic and atraumatic  Eyes:   PUPILS ARE EQUAL    EXTRAOCULAR MUSCLES ARE INTACT, ALTHOUGH HE DOES NOT FOLLOW COMMANDS   Neurological: He is unresponsive  He is not disoriented  No cranial nerve deficit  GCS eye subscore is 2  GCS verbal subscore is 1  GCS motor subscore is 4  PATIENT HAS A GCS OF ABOUT 7  HE DOES NOT FOLLOW COMMANDS  HE DOES HAVE PAROXYSMAL MOVEMENTS, TONIC OF HIS UPPER AND LOWER EXTREMITIES  HE IS NOT EXHIBITING ANY JAW CLENCHING  Vital Signs  ED Triage Vitals [09/26/19 0245]   Temperature Pulse Respirations Blood Pressure SpO2   (!) 97 4 °F (36 3 °C) (!) 53 20 152/83 98 %      Temp src Heart Rate Source Patient Position - Orthostatic VS BP Location FiO2 (%)   -- -- -- -- --      Pain Score       --           Vitals:    09/26/19 0430 09/26/19 0445 09/26/19 0500 09/26/19 0515   BP: (!) 185/81 (!) 203/81  (!) 172/82   Pulse: (!) 45 (!) 52 (!) 52 (!) 52         Visual Acuity  Visual Acuity      Most Recent Value   L Pupil Size (mm)  3   R Pupil Size (mm)  3          ED Medications  Medications   sodium chloride 0 9 % infusion (125 mL/hr Intravenous New Bag 9/26/19 0336)   atorvastatin (LIPITOR) tablet 40 mg (has no administration in time range)   ondansetron (ZOFRAN) injection 4 mg (has no administration in time range)   phytonadione (AQUA-MEPHYTON) 10 mg/mL 10 mg in sodium chloride 0 9 % 50 mL IVPB (has no administration in time range)   scopolamine (TRANSDERM-SCOP) 1 5 mg/3 days TD 72 hr patch 1 patch (has no administration in time range)       Diagnostic Studies  Results Reviewed     Procedure Component Value Units Date/Time    Urine Microscopic [[id number]] Collected:  09/26/19 0522    Lab Status: In process Specimen:  Urine, Indwelling Kowalski Catheter Updated:  09/26/19 0550    UA w Reflex to Microscopic w Reflex to Culture [[id number]] Collected:  09/26/19 0522    Lab Status:   In process Specimen:  Urine, Indwelling Kowalski Catheter Updated:  09/26/19 0527    Lipid Panel with Direct LDL reflex [[id number]]     Lab Status:  No result Specimen:  Blood Hemoglobin A1c w/EAG Estimation [[id number]]     Lab Status:  No result Specimen:  Blood     Ethanol [[id number]]  (Normal) Collected:  09/26/19 0328    Lab Status:  Final result Specimen:  Blood from Arm, Right Updated:  09/26/19 0402     Ethanol Lvl <10 mg/dL     Comprehensive metabolic panel [[id number]]  (Abnormal) Collected:  09/26/19 0251    Lab Status:  Final result Specimen:  Blood from Hand, Right Updated:  09/26/19 0316     Sodium 138 mmol/L      Potassium 4 4 mmol/L      Chloride 107 mmol/L      CO2 24 mmol/L      ANION GAP 7 mmol/L      BUN 27 mg/dL      Creatinine 1 42 mg/dL      Glucose 141 mg/dL      Calcium 9 4 mg/dL      AST 22 U/L      ALT 16 U/L      Alkaline Phosphatase 105 U/L      Total Protein 6 8 g/dL      Albumin 4 1 g/dL      Total Bilirubin 0 80 mg/dL      eGFR 42 ml/min/1 73sq m     Narrative:       [place] guidelines for Chronic Kidney Disease (CKD):     Stage 1 with normal or high GFR (GFR > 90 mL/min/1 73 square meters)    Stage 2 Mild CKD (GFR = 60-89 mL/min/1 73 square meters)    Stage 3A Moderate CKD (GFR = 45-59 mL/min/1 73 square meters)    Stage 3B Moderate CKD (GFR = 30-44 mL/min/1 73 square meters)    Stage 4 Severe CKD (GFR = 15-29 mL/min/1 73 square meters)    Stage 5 End Stage CKD (GFR <15 mL/min/1 73 square meters)  Note: GFR calculation is accurate only with a steady state creatinine    Protime-INR [[id number]]  (Abnormal) Collected:  09/26/19 0251    Lab Status:  Final result Specimen:  Blood from Arm, Right Updated:  09/26/19 0306     Protime 41 4 seconds      INR 3 52    APTT [[id number]]  (Abnormal) Collected:  09/26/19 0251    Lab Status:  Final result Specimen:  Blood from Arm, Right Updated:  09/26/19 0306     PTT 41 seconds     CBC and differential [[id number]]  (Abnormal) Collected:  09/26/19 0251    Lab Status:  Final result Specimen:  Blood from Hand, Right Updated:  09/26/19 0300     WBC 9 90 Thousand/uL      RBC 4 23 Million/uL Hemoglobin 14 3 g/dL      Hematocrit 42 9 %       fL      MCH 33 9 pg      MCHC 33 4 g/dL      RDW 14 6 %      MPV 8 7 fL      Platelets 365 Thousands/uL      Neutrophils Relative 52 %      Lymphocytes Relative 31 %      Monocytes Relative 13 %      Eosinophils Relative 3 %      Basophils Relative 1 %      Neutrophils Absolute 5 10 Thousands/µL      Lymphocytes Absolute 3 10 Thousands/µL      Monocytes Absolute 1 30 Thousand/µL      Eosinophils Absolute 0 30 Thousand/µL      Basophils Absolute 0 10 Thousands/µL                  CT head without contrast   Final Result by Little Renner MD (09/26 0335)      Left hyperdense MCA sinus concerning for stroke  I personally discussed this study with Magi Kennedy on 9/26/2019 at 3:33 AM                Workstation performed: [id number]         MRI Inpatient Order    (Results Pending)              Procedures  Procedures       ED Course      IMMEDIATE EXAM AND EVALUATION  Angeles Mascorro PATIENT PRESENTED FROM EMS AS AN UNRESPONSIVE PATIENT  EMS FOUND ON THE TOILET  FAMILY MEMBERS INDICATED TO ME THAT THEY DID NOT KNOW WHEN HE WAS LAST SEEN NORMAL  FAMILY AND WIFE INDICATED THAT THE PATIENT DOES NOT WANT TO BE INTUBATED OR HAVE CPR       WIFE INDICATED THEY WENT TO BED AT THE USUAL TIME AT 10:00 P  M  AND IN THE MIDDLE THE NIGHT BETWEEN 1  THE PATIENT GOT UP TO GO THE BATHROOM  WHEN HE DID NOT COME BACK FROM THE BATHROOM THE WIFE CHECKED HIM IN THE BATHROOM AND HE WAS UNRESPONSIVE  IT IS UNCLEAR AS TO WHETHER THE PATIENT AMBULATED STEADILY FROM THE BED TO THE BATHROOM  AS HIS LAST DEFINITE KNOWN TIME WELL WAS 10:00 P  M  AND HE ARRIVED AT 2:30 A  M  HE DOES FALL OUT OF THE 4 HOUR WINDOW FOR TPA ADMINISTRATION PARTICULARLY WITH HIS ADVANCED AGE AT 89  THE PATIENT UNDERWENT UNENHANCED CT IMAGING, AND THE RADIOLOGIST CONTACTED ME BY PHONE TO TELL ME HE HAS A HYPERDENSITY INDICATIVE OF A THROMBUS IN THE LEFT MCA    FAMILY INDICATED, PARTICULARLY HIS WIFE, THAT THEY WOULD BE WILLING TO HAVE THE PATIENT UNDERGO AN INTERVENTIONAL PROCEDURE FOR THE MCA CLOT IF APPROPRIATE  CASE REVIEWED WITH THE NEUROINTERVENTIONAL SPECIALIST W HIS ADVANCED HO INDICATED WITH THE PATIENT'S ADVANCED AGE, AND PROFOUND DEFICIT WITH A GCS OF APPROXIMATELY 7 RISK OF INTERVENTIONAL PROCEDURE OUTWEIGHED POTENTIAL BENEFITS  FAMILY MADE AWARE THAT THE PATIENT IS NOT A CANDIDATE FOR AN INTERVENTIONAL PROCEDURE  THE CASE WAS THEN REVIEWED WITH NEUROLOGY WHO INDICATED THAT THE ANTICOAGULATION POST AORTO RISK OF BLEEDING AND RECOMMENDED NORMALIZING THE PATIENT'S INR  AN TO ADMIT THE PATIENT TO ICU, PLAN FOR POTENTIAL MRI IMAGING UNLESS THE FAMILY DOES NOT WANT TO PURSUE FURTHER TESTING  PATIENT GIVEN IV VITAMIN K  AGAIN, PATIENT PRESENTED AS AN UNRESPONSIVE PATIENT WITH A GCS OF 7  PLAIN CT IMAGING SHOWED A CLOT IN THE LEFT MCA  INTERVENTIONAL SPECIALIST INDICATED HE IS NOT A CANDIDATE FOR CLOT RETRIEVAL  NEUROLOGY SPECIALIST INDICATE THAT LAST KNOWN WELL TIME IS LIKELY 10:00 P M  WHEN THE PATIENT WENT TO BED AND THE TIME OF PRESENTATION PLACED HIM OUT OF THE WINDOW FOR TPA  RECOMMENDED TO NORMALIZE THE PATIENT'S INR TO AVOID THE POTENTIAL FOR BLEEDING  Stroke Assessment     Row Name 09/26/19 0430             NIH Stroke Scale    Interval  Other (Comment) VERY DIFFICULT, PATIENT HAS A GCS OF 7 COMPLETELY NONVERBAL       Level of Consciousness (1a )  3      LOC Questions (1b )  2      LOC Commands (1c )  2      Best Gaze (2 )  0      Visual (3 )  0      Facial Palsy (4 )  0      Motor Arm, Left (5a )  2      Motor Arm, Right (5b )  2      Motor Leg, Left (6a )  2      Motor Leg, Right (6b )  2      Limb Ataxia (7 )  2      Sensory (8 )  0      Best Language (9 )  3      Dysarthria (10 )  2      Extinction and Inattention (11 ) (Formerly Neglect)  1      Total  23                    MDM    Disposition  Final diagnoses:    Altered mental status     Time reflects when diagnosis was documented in both MDM as applicable and the Disposition within this note     Time User Action Codes Description Comment    9/26/2019  5:12 AM Cassidy Zapata Add [R41 82] Altered mental status     9/26/2019  5:12 AM Tesfaye Thad MONTGOMERY Add [I63 232] Acute ischemic left ICA stroke [facility])       ED Disposition     ED Disposition Condition Date/Time Comment    Admit Stable Thu Sep 26, 2019  5:12 AM Case was discussed with BENEDICT and the patient's admission status was agreed to be Admission Status: inpatient status to the service of Dr Federico Chandler   Follow-up Information    None         Patient's Medications   Discharge Prescriptions    No medications on file     No discharge procedures on file      ED Provider  Electronically Signed by           Robert Munoz DO  09/26/19 0618

## 2019-09-26 NOTE — ASSESSMENT & PLAN NOTE
· Patient found on toilet, unknown time last well, approx 10pm per wife they went to bed  · CT head: Left hyperdense MCA sinus concerning for stroke  · Case reviewed by Neurology, recommend ICU admit, check MRI brain  If no improvement, hospice indicated    · Case was also reviewed with Neuro intervention by ER attending  · If improves will need PT/OT/Speech evals  · Hold aspirin for now per Neuro  · Statin if able to take PO  · Patient is DNR/DNI per discussion with daughter and wife at bedside

## 2019-09-27 PROBLEM — Z51.5 HOSPICE CARE: Status: ACTIVE | Noted: 2019-01-01

## 2019-09-27 PROBLEM — Z51.5 END OF LIFE CARE: Status: ACTIVE | Noted: 2019-01-01

## 2019-09-27 NOTE — NURSING NOTE
Pt's family remained at bedside throughout the night  Pt continues to be monitored for any pain/discomfort and continues to receive PRN morphine  Frequent rounding is established and maintained

## 2019-09-27 NOTE — PLAN OF CARE
Problem: Prexisting or High Potential for Compromised Skin Integrity  Goal: Skin integrity is maintained or improved  Description  INTERVENTIONS:  - Identify patients at risk for skin breakdown  - Assess and monitor skin integrity  - Assess and monitor nutrition and hydration status  - Monitor labs   - Assess for incontinence   - Turn and reposition patient  - Assist with mobility/ambulation  - Relieve pressure over bony prominences  - Avoid friction and shearing  - Provide appropriate hygiene as needed including keeping skin clean and dry  - Evaluate need for skin moisturizer/barrier cream  - Collaborate with interdisciplinary team   - Patient/family teaching  - Consider wound care consult   Outcome: Progressing     Problem: Potential for Falls  Goal: Patient will remain free of falls  Description  INTERVENTIONS:  - Assess patient frequently for physical needs  -  Identify cognitive and physical deficits and behaviors that affect risk of falls    -  [place] fall precautions as indicated by assessment   - Educate patient/family on patient safety including physical limitations  - Instruct patient to call for assistance with activity based on assessment  - Modify environment to reduce risk of injury  - Consider OT/PT consult to assist with strengthening/mobility  Outcome: Progressing     Problem: NEUROSENSORY - ADULT  Goal: Remains free of injury related to seizures activity  Description  INTERVENTIONS  - Maintain airway, patient safety  and administer oxygen as ordered  - Monitor patient for seizure activity, document and report duration and description of seizure to physician/advanced practitioner  - If seizure occurs,  ensure patient safety during seizure  - Reorient patient post seizure  - Seizure pads on all 4 side rails  - Instruct patient/family to notify RN of any seizure activity including if an aura is experienced  - Instruct patient/family to call for assistance with activity based on nursing assessment  - Administer anti-seizure medications if ordered    Outcome: Not Progressing     Problem: CARDIOVASCULAR - ADULT  Goal: Maintains optimal cardiac output and hemodynamic stability  Description  INTERVENTIONS:  - Monitor I/O, vital signs and rhythm  - Monitor for S/S and trends of decreased cardiac output  - Administer and titrate ordered vasoactive medications to optimize hemodynamic stability  - Assess quality of pulses, skin color and temperature  - Assess for signs of decreased coronary artery perfusion  - Instruct patient to report change in severity of symptoms  Outcome: Not Progressing  Goal: Absence of cardiac dysrhythmias or at baseline rhythm  Description  INTERVENTIONS:  - Continuous cardiac monitoring, vital signs, obtain 12 lead EKG if ordered  - Administer antiarrhythmic and heart rate control medications as ordered  - Monitor electrolytes and administer replacement therapy as ordered  Outcome: Not Progressing     Problem: RESPIRATORY - ADULT  Goal: Achieves optimal ventilation and oxygenation  Description  INTERVENTIONS:  - Assess for changes in respiratory status  - Assess for changes in mentation and behavior  - Position to facilitate oxygenation and minimize respiratory effort  - Oxygen administered by appropriate delivery if ordered  - Initiate smoking cessation education as indicated  - Encourage broncho-pulmonary hygiene including cough, deep breathe, Incentive Spirometry  - Assess the need for suctioning and aspirate as needed  - Assess and instruct to report SOB or any respiratory difficulty  - Respiratory Therapy support as indicated  Outcome: Not Progressing     Problem: SKIN/TISSUE INTEGRITY - ADULT  Goal: Skin integrity remains intact  Description  INTERVENTIONS  - Identify patients at risk for skin breakdown  - Assess and monitor skin integrity  - Assess and monitor nutrition and hydration status  - Monitor labs (i e  albumin)  - Assess for incontinence   - Turn and reposition patient  - Assist with mobility/ambulation  - Relieve pressure over bony prominences  - Avoid friction and shearing  - Provide appropriate hygiene as needed including keeping skin clean and dry  - Evaluate need for skin moisturizer/barrier cream  - Collaborate with interdisciplinary team (i e  Nutrition, Rehabilitation, etc )   - Patient/family teaching  Outcome: Not Progressing  Goal: Oral mucous membranes remain intact  Description  INTERVENTIONS  - Assess oral mucosa and hygiene practices  - Implement preventative oral hygiene regimen  - Implement oral medicated treatments as ordered  - Initiate Nutrition services referral as needed  Outcome: Not Progressing     Problem: COPING  Goal: Pt/Family able to verbalize concerns and demonstrate effective coping strategies  Description  INTERVENTIONS:  - Assist patient/family to identify coping skills, available support systems and cultural and spiritual values  - Provide emotional support, including active listening and acknowledgement of concerns of patient and caregivers  - Reduce environmental stimuli, as able  - Provide patient education  - Assess for spiritual pain/suffering and initiate spiritual care, including notification of Pastoral Care or vitaliy based community as needed  - Assess effectiveness of coping strategies  Outcome: Not Progressing  Goal: Will report anxiety at manageable levels  Description  INTERVENTIONS:  - Administer medication as ordered  - Teach and encourage coping skills  - Provide emotional support  - Assess patient/family for anxiety and ability to cope  Outcome: Not Progressing     Problem: DEATH & DYING  Goal: Pt/Family communicate acceptance of impending death and expresses psychological comfort and peace  Description  INTERVENTIONS:  - Assess patient/family anxiety and grief process related to end of life issues  - Provide emotional, spiritual and psychosocial support  - Provide information about the patients health status with consideration of family and cultural values  - Communicate willingness to discuss death and facilitate grief process  with patient/family as appropriate  - Emphasize sustaining relationships within family system and community, or vitaliy/spiritual traditions  - [address], Pastoral care or other ancillary consults as needed  - Refer to community support groups as appropriate  Outcome: Not Progressing     Problem: DISCHARGE PLANNING - CARE MANAGEMENT  Goal: Discharge to post-acute care or home with appropriate resources  Description  INTERVENTIONS:  - Conduct assessment to determine patient/family and health care team treatment goals, and need for post-acute services based on payer coverage, community resources, and patient preferences, and barriers to discharge  - Address psychosocial, clinical, and financial barriers to discharge as identified in assessment in conjunction with the patient/family and health care team  - Arrange appropriate level of post-acute services according to patients   needs and preference and payer coverage in collaboration with the physician and health care team  - Communicate with and update the patient/family, physician, and health care team regarding progress on the discharge plan  - Arrange appropriate transportation to post-acute venues  Outcome: Not Progressing

## 2019-09-27 NOTE — PROGRESS NOTES
Progress Note - Magdaleno Forman 3/4/1924, 80 y o  male MRN: [id number]    Unit/Bed#: -01 Encounter: [id number]    Primary Care Provider: ELDER Chin   Date and time admitted to hospital: 9/26/2019  3:09 AM        End of life care  Assessment & Plan  · Patient remains unresponsive with no purposeful interaction  · Continue morphine, Ativan and scopolamine  · Will formally consult hospice    * Acute ischemic left ICA stroke (HCC)  Assessment & Plan  · No further workup  · Continued end of life care    Acute encephalopathy  Assessment & Plan  · Secondary to the acute CVA  · Patient remains unresponsive  · Case reviewed with family yesterday morning, full-blown comfort measures have been implemented    Atrial flutter ([address] )  Assessment & Plan  · No further testing, treatment and/or workup  · Patient had presented initially with a Coumadin coagulopathy    Essential hypertension  Assessment & Plan  · No further testing and/or treatment    Hypothyroidism  Assessment & Plan  · No further testing and/or treatment        VTE Pharmacologic Prophylaxis: Pharmacologic: No pharmacological VTE prophylaxis is indicated this patient on comfort measures at the end of his life    Patient Centered Rounds: I have performed bedside rounds with nursing staff today  Discussions with Specialists or Other Care Team Provider:  Case management, nursing and pharmacy  Education and Discussions with Family / Patient:   Will attempt to reach out to the patient's family members as day progresses    Current Length of Stay: 1 day(s)    Current Patient Status: Inpatient   Certification Statement: The patient will continue to require additional inpatient hospital stay due to Continued end of life care as well as the impending hospice evaluation    Discharge Plan:  No discharge planning as of yet    Code Status: Level 4 - Comfort Care    Subjective:   Patient seen examined, remains unresponsive with no purposeful interactions    Objective:     Vitals:   Temp (24hrs), Av 2 °F (37 3 °C), Min:99 2 °F (37 3 °C), Max:99 2 °F (37 3 °C)    Temp:  [99 2 °F (37 3 °C)] 99 2 °F (37 3 °C)  HR:  [62] 62  Resp:  [16] 16  BP: (125)/(61) 125/61  SpO2:  [92 %] 92 %  Body mass index is 25 07 kg/m²  Input and Output Summary (last 24 hours): Intake/Output Summary (Last 24 hours) at 2019 1147  Last data filed at 2019 0700  Gross per 24 hour   Intake --   Output 500 ml   Net -500 ml       Physical Exam:     Physical Exam   Constitutional: He appears well-developed and well-nourished  HENT:   Head: Normocephalic and atraumatic  Nose: Nose normal    Mouth/Throat: Oropharynx is clear and moist    Eyes: Pupils are equal, round, and reactive to light  Conjunctivae and EOM are normal    Neck: Normal range of motion  Neck supple  No JVD present  No thyromegaly present  Cardiovascular: Intact distal pulses  Exam reveals no gallop and no friction rub  No murmur heard  S1 plus S2, irregularly irregular and tachycardic   Pulmonary/Chest: Effort normal and breath sounds normal  No respiratory distress  Increased respiratory effort  Shallow breathing noted  Decreased breath sounds bilaterally at the bases, only auscultated anteriorly   Abdominal: Soft  Bowel sounds are normal  He exhibits no distension and no mass  There is no tenderness  There is no guarding  Musculoskeletal: Normal range of motion  He exhibits no edema  Lymphadenopathy:     He has no cervical adenopathy  Neurological: No cranial nerve deficit  Unresponsive   Skin: Skin is warm  No rash noted  No erythema  Psychiatric: He has a normal mood and affect  His behavior is normal    Vitals reviewed        Additional Data:     Labs:    Results from last 7 days   Lab Units 19  0251   WBC Thousand/uL 9 90   HEMOGLOBIN g/dL 14 3   HEMATOCRIT % 42 9   PLATELETS Thousands/uL 247   NEUTROS PCT % 52   LYMPHS PCT % 31   MONOS PCT % 13*   EOS PCT % 3 Results from last 7 days   Lab Units 09/26/19  0251   POTASSIUM mmol/L 4 4   CHLORIDE mmol/L 107   CO2 mmol/L 24   BUN mg/dL 27*   CREATININE mg/dL 1 42*   CALCIUM mg/dL 9 4   ALK PHOS U/L 105   ALT U/L 16   AST U/L 22     Results from last 7 days   Lab Units 09/26/19  0251   INR  3 52*               * I Have Reviewed All Lab Data Listed Above  * Additional Pertinent Lab Tests Reviewed: [facility] Admission  Reviewed    Imaging:  Imaging Reports Reviewed Today Include:  None    Recent Cultures (last 7 days):           Last 24 Hours Medication List:     Current Facility-Administered Medications:  LORazepam 1 mg Intravenous Q1H PRN Colby Ortiz MD   morphine injection 1 mg Intravenous Q1H PRN Colby Ortiz MD   ondansetron 4 mg Intravenous Q6H PRN Colby Ortiz MD   scopolamine 1 patch Transdermal Sobia Boggs MD        Today, Patient Was Seen By: Colby Ortiz MD    ** Please Note: Dictation voice to text software may have been used in the creation of this document   **

## 2019-09-27 NOTE — PLAN OF CARE
Problem: Prexisting or High Potential for Compromised Skin Integrity  Goal: Skin integrity is maintained or improved  Description  INTERVENTIONS:  - Identify patients at risk for skin breakdown  - Assess and monitor skin integrity  - Assess and monitor nutrition and hydration status  - Monitor labs   - Assess for incontinence   - Turn and reposition patient  - Assist with mobility/ambulation  - Relieve pressure over bony prominences  - Avoid friction and shearing  - Provide appropriate hygiene as needed including keeping skin clean and dry  - Evaluate need for skin moisturizer/barrier cream  - Collaborate with interdisciplinary team   - Patient/family teaching  - Consider wound care consult   Outcome: Not Progressing     Problem: NEUROSENSORY - ADULT  Goal: Remains free of injury related to seizures activity  Description  INTERVENTIONS  - Maintain airway, patient safety  and administer oxygen as ordered  - Monitor patient for seizure activity, document and report duration and description of seizure to physician/advanced practitioner  - If seizure occurs,  ensure patient safety during seizure  - Reorient patient post seizure  - Seizure pads on all 4 side rails  - Instruct patient/family to notify RN of any seizure activity including if an aura is experienced  - Instruct patient/family to call for assistance with activity based on nursing assessment  - Administer anti-seizure medications if ordered    Outcome: Not Progressing     Problem: CARDIOVASCULAR - ADULT  Goal: Maintains optimal cardiac output and hemodynamic stability  Description  INTERVENTIONS:  - Monitor I/O, vital signs and rhythm  - Monitor for S/S and trends of decreased cardiac output  - Administer and titrate ordered vasoactive medications to optimize hemodynamic stability  - Assess quality of pulses, skin color and temperature  - Assess for signs of decreased coronary artery perfusion  - Instruct patient to report change in severity of symptoms  Outcome: Not Progressing  Goal: Absence of cardiac dysrhythmias or at baseline rhythm  Description  INTERVENTIONS:  - Continuous cardiac monitoring, vital signs, obtain 12 lead EKG if ordered  - Administer antiarrhythmic and heart rate control medications as ordered  - Monitor electrolytes and administer replacement therapy as ordered  Outcome: Not Progressing     Problem: RESPIRATORY - ADULT  Goal: Achieves optimal ventilation and oxygenation  Description  INTERVENTIONS:  - Assess for changes in respiratory status  - Assess for changes in mentation and behavior  - Position to facilitate oxygenation and minimize respiratory effort  - Oxygen administered by appropriate delivery if ordered  - Initiate smoking cessation education as indicated  - Encourage broncho-pulmonary hygiene including cough, deep breathe, Incentive Spirometry  - Assess the need for suctioning and aspirate as needed  - Assess and instruct to report SOB or any respiratory difficulty  - Respiratory Therapy support as indicated  Outcome: Not Progressing     Problem: SKIN/TISSUE INTEGRITY - ADULT  Goal: Skin integrity remains intact  Description  INTERVENTIONS  - Identify patients at risk for skin breakdown  - Assess and monitor skin integrity  - Assess and monitor nutrition and hydration status  - Monitor labs (i e  albumin)  - Assess for incontinence   - Turn and reposition patient  - Assist with mobility/ambulation  - Relieve pressure over bony prominences  - Avoid friction and shearing  - Provide appropriate hygiene as needed including keeping skin clean and dry  - Evaluate need for skin moisturizer/barrier cream  - Collaborate with interdisciplinary team (i e  Nutrition, Rehabilitation, etc )   - Patient/family teaching  Outcome: Not Progressing  Goal: Oral mucous membranes remain intact  Description  INTERVENTIONS  - Assess oral mucosa and hygiene practices  - Implement preventative oral hygiene regimen  - Implement oral medicated treatments as ordered  - Initiate Nutrition services referral as needed  Outcome: Not Progressing     Problem: COPING  Goal: Pt/Family able to verbalize concerns and demonstrate effective coping strategies  Description  INTERVENTIONS:  - Assist patient/family to identify coping skills, available support systems and cultural and spiritual values  - Provide emotional support, including active listening and acknowledgement of concerns of patient and caregivers  - Reduce environmental stimuli, as able  - Provide patient education  - Assess for spiritual pain/suffering and initiate spiritual care, including notification of Pastoral Care or vitaliy based community as needed  - Assess effectiveness of coping strategies  Outcome: Not Progressing  Goal: Will report anxiety at manageable levels  Description  INTERVENTIONS:  - Administer medication as ordered  - Teach and encourage coping skills  - Provide emotional support  - Assess patient/family for anxiety and ability to cope  Outcome: Not Progressing     Problem: DEATH & DYING  Goal: Pt/Family communicate acceptance of impending death and expresses psychological comfort and peace  Description  INTERVENTIONS:  - Assess patient/family anxiety and grief process related to end of life issues  - Provide emotional, spiritual and psychosocial support  - Provide information about the patients health status with consideration of family and cultural values  - Communicate willingness to discuss death and facilitate grief process  with patient/family as appropriate  - Emphasize sustaining relationships within family system and community, or vitaliy/spiritual traditions  - Initiate Spiritual Care, Pastoral care or other ancillary consults as needed  - Refer to community support groups as appropriate  Outcome: Not Progressing     Problem: Potential for Falls  Goal: Patient will remain free of falls  Description  INTERVENTIONS:  - Assess patient frequently for physical needs  -  Identify cognitive and physical deficits and behaviors that affect risk of falls    -  [place] fall precautions as indicated by assessment   - Educate patient/family on patient safety including physical limitations  - Instruct patient to call for assistance with activity based on assessment  - Modify environment to reduce risk of injury  - Consider OT/PT consult to assist with strengthening/mobility  Outcome: Not Progressing     Problem: DISCHARGE PLANNING - CARE MANAGEMENT  Goal: Discharge to post-acute care or home with appropriate resources  Description  INTERVENTIONS:  - Conduct assessment to determine patient/family and health care team treatment goals, and need for post-acute services based on payer coverage, community resources, and patient preferences, and barriers to discharge  - Address psychosocial, clinical, and financial barriers to discharge as identified in assessment in conjunction with the patient/family and health care team  - Arrange appropriate level of post-acute services according to patients   needs and preference and payer coverage in collaboration with the physician and health care team  - Communicate with and update the patient/family, physician, and health care team regarding progress on the discharge plan  - Arrange appropriate transportation to post-acute venues  Outcome: Not Progressing

## 2019-09-27 NOTE — SOCIAL WORK
Visit with family in his hospital room  Discussed hospice  Family would like inpatient hospice  They chose [facility]  Family let me know "they" told them patient could stay here with hospice  Referral made to [facility]

## 2019-09-27 NOTE — ASSESSMENT & PLAN NOTE
· Patient remains unresponsive with no purposeful interaction  · Continue morphine, Ativan and scopolamine  · Patient being admitted to inpatient hospice

## 2019-09-27 NOTE — ASSESSMENT & PLAN NOTE
· No further testing, treatment and/or workup  · Patient had presented initially with a Coumadin coagulopathy

## 2019-09-27 NOTE — NURSING NOTE
CONTINUES TO BE MEDICATED PRN WITH IV MORPHINE FOR S/S OF DISCOMFORT/PAIN  FAMILY IS AT BEDSIDE   HE REMAINS UNRESPONSIVE STATUS

## 2019-09-27 NOTE — ASSESSMENT & PLAN NOTE
· Secondary to the acute CVA  · Patient remains unresponsive  · Patient being admitted to inpatient hospice

## 2019-09-27 NOTE — HOSPICE NOTE
Reviewed poc with daughter meghann and patients wife   reviewed hospice care and comfort measures   family is requesting comfort measures only   reviewed consents  mrs anguiano requested daughter sign   reviewed when death is near booklet   family is aware that he is actively dying     Patient is actively dying is not responsive has terminal congestion   pain ad 2 with repositioning  russell catheter draining yellow urine  oral care given  v s   100 60 26  125 60  postioned on right side     Reviewed with family that support services are available if needed     Reviewed care with Dr Cee Point  continue comfort measures   support to family  Phoned patients [facility] left message requesting  to visit  [facility]

## 2019-09-27 NOTE — ASSESSMENT & PLAN NOTE
· Secondary to the acute CVA  · Patient remains unresponsive  · Case reviewed with family yesterday morning, full-blown comfort measures have been implemented

## 2019-09-27 NOTE — ASSESSMENT & PLAN NOTE
· Patient remains unresponsive with no purposeful interaction  · Continue morphine, Ativan and scopolamine  · Will formally consult hospice

## 2019-09-27 NOTE — ASSESSMENT & PLAN NOTE
· No further testing, treatment and/or workup  · Patient had presented initially with a Coumadin coagulopathy  · Patient being admitted to inpatient hospice

## 2019-09-27 NOTE — DISCHARGE SUMMARY
Discharge- Alexander Decker 3/4/1924, 80 y o  male MRN: [id number]    Unit/Bed#: -01 Encounter: [id number]    Primary Care Provider: ELDER Madrigal   Date and time admitted to hospital: 9/26/2019  3:09 AM        End of life care  Assessment & Plan  · Patient remains unresponsive with no purposeful interaction  · Continue morphine, Ativan and scopolamine  · Patient being admitted to inpatient hospice    Atrial flutter ([address] )  Assessment & Plan  · No further testing, treatment and/or workup  · Patient had presented initially with a Coumadin coagulopathy  · Patient being admitted to inpatient hospice    Acute encephalopathy  Assessment & Plan  · Secondary to the acute CVA  · Patient remains unresponsive  · Patient being admitted to inpatient hospice    * Acute ischemic left ICA stroke [facility])  Assessment & Plan  · No further workup  · Patient being admitted to hospice          Discharging Physician / Practitioner: Hannah White MD  PCP: Shante Ferrer [address] [place] [address]  Admission Date:   Admission Orders (From admission, onward)     Ordered        09/26/19 0513  Inpatient Admission (expected length of stay for this patient Order details is greater than two midnights)  Once                   Discharge Date: 09/27/19    Disposition:      Other: Inpatient hospice    For Discharges to [facility] SNF:   · Not Applicable to this Patient - Not Applicable to this Patient    Reason for Admission: cva    Discharge Diagnoses:     Please see assessment and plan section above for further details regarding discharge diagnoses       Resolved Problems  Date Reviewed: 9/27/2019    None            Consultations During Hospital Stay:  · Neurology  · hospice    Procedures Performed:   · n/a    Medication Adjustments and Discharge Medications:  · Summary of Medication Adjustments made as a result of this hospitalization: comfort meds only  · Medication Dosing Tapers - Please refer to Discharge Medication List for details on any medication dosing tapers (if applicable to patient)  · Medications being temporarily held (include recommended restart time): comfort meds only  · Discharge Medication List: See after visit summary for reconciled discharge medications  Wound Care Recommendations:  When applicable, please see wound care section of After Visit Summary  Diet Recommendations at Discharge:   Diet -        Diet Orders   (From admission, onward)             Start     Ordered    09/26/19 0516  Diet NPO; Sips with meds  Diet effective now     Question Answer Comment   Diet Type NPO    NPO Except: Sips with meds    RD to adjust diet per protocol? Yes        09/26/19 0516              Fluid Restriction - No Fluid Restriction at Discharge  Instructions for any Catheters / Lines Present at Discharge (including removal date, if applicable): for comfort    Significant Findings / Test Results:   · CVA    Incidental Findings:   · n/a     Test Results Pending at Discharge (will require follow up):   · n/a     Outpatient Tests Requested:  · n/a    Complications:    · n/a    Hospital Course:     Dhara Corona is a 80 y o  male patient who originally presented to the hospital on 9/26/2019 due to altered mental status and was found to have an acute CVA  He was not deemed a tPA candidate as per Neurology  Due to his advanced age, and poor clinical condition, family elected to pursue comfort care and hospice measures  Patient is being admitted to hospice  Condition at Discharge: critical     Discharge Day Visit / Exam:     * Please refer to separate progress note for these details *    Goals of Care Discussions:  · Code Status at Discharge: Level 4 - Comfort Care  · Were there any Goals of Care Discussions during Hospitalization?: Yes  · Results of any General Goals of Care Discussions: comfort care   · POLST Completed: No   · If POLST Completed, Summary of POLST Agreement Provided Here: n/a   · OK to Rehospitalize if Needed? No    Discharge instructions/Information to patient and family:   See after visit summary section titled Discharge Instructions for information provided to patient and family  Planned Readmission: hospice      Discharge Statement:  I spent 35 minutes discharging the patient  This time was spent on the day of discharge  I had direct contact with the patient on the day of discharge  Greater than 50% of the total time was spent examining patient, answering all patient questions, arranging and discussing plan of care with patient as well as directly providing post-discharge instructions  Additional time then spent on discharge activities      ** Please Note: This note has been constructed using a voice recognition system **

## 2019-09-28 NOTE — PLAN OF CARE
Problem: PAIN - ADULT  Goal: Verbalizes/displays adequate comfort level or baseline comfort level  Description  Interventions:  - Encourage patient to monitor pain and request assistance  - Assess pain using appropriate pain scale  - Administer analgesics based on type and severity of pain and evaluate response  - Implement non-pharmacological measures as appropriate and evaluate response  - Consider cultural and social influences on pain and pain management  - Notify physician/advanced practitioner if interventions unsuccessful or patient reports new pain  9/28/2019 0417 by Amanda Calvo RN  Outcome: Progressing  9/28/2019 0414 by Amanda Calvo RN  Outcome: Progressing     Problem: DISCHARGE PLANNING  Goal: Discharge to home or other facility with appropriate resources  9/28/2019 0417 by Amanda Calvo RN  Outcome: Progressing  9/28/2019 0414 by Amanda Calvo RN  Outcome: Progressing     Problem: Knowledge Deficit  Goal: Patient/family/caregiver demonstrates understanding of disease process, treatment plan, medications, and discharge instructions  Description  Complete learning assessment and assess knowledge base  Interventions:  - Provide teaching to family about what to expect    9/28/2019 0417 by Amanda Calvo RN  Outcome: Progressing  9/28/2019 0414 by Amanda Calvo RN  Outcome: Progressing

## 2019-09-28 NOTE — HOSPICE NOTE
RN Hospice Note    Dee Pedro is a Hospice Patient  Current active issues include:patient is actively dying   has terminal congestion  apnea  I have attempted to contact this patient by telephone, but there is no answer repeatedly  I will continue to try later  responsive  Current interventions include: oral care   recieved iv medications  repositioning  Response to interventions: pain ad 0 at time of visit  Updated/planned interventions include: bed bath given   support to family  Patient/Family discussion covered: family wants patient to remain at [facility], [place] is not safe for transfers  Needs iv medications for symptom management  Patient/Family acceptance: family is at bed side per pts wife she feels very supportive

## 2019-09-28 NOTE — H&P
H&P- Shira Munguia 3/4/1924, 80 y o  male MRN: [id number]    Unit/Bed#: -01 Encounter: [id number]    Primary Care Provider: ELDER Beal   Date and time admitted to hospital: 9/27/2019  7:13 PM        * Hospice care  Assessment & Plan  · Patient being admitted to inpatient hospice after CVA  · Comfort care measures as per hospice        VTE Prophylaxis: hospice  Code Status: comfort care  POLST: POLST is not applicable to this patient  Discussion with family:kelsie at bedside    Anticipated Length of Stay:  Patient will be admitted on an Hospice basis with an anticipated length of stay of  > 2 midnights  Justification for Hospital Stay: hospice    Total Time for Visit, including Counseling / Coordination of Care: 45 minutes  Greater than 50% of this total time spent on direct patient counseling and coordination of care  Chief Complaint:   Unable to obtain    History of Present Illness:    Shira Munguia is a 80 y o  male who presents with recent CVA and now being admitted to inpatient hospice  Please see previous H&P and discharge summary for further details of his previous presentation  Patient was admitted after a CVA  Family elected to pursue comfort care measures and patient is being admitted to inpatient hospice currently  Unable to obtain any history from the patient  Review of Systems:  Review of Systems   Unable to perform ROS: Patient unresponsive       Past Medical and Surgical History:   Past Medical History:   Diagnosis Date    Atrial fibrillation [facility] )     Atrial flutter ([facility] )     Disease of thyroid gland     Hypertension     Stroke [facility])        Past Surgical History:   Procedure Laterality Date    CATARACT EXTRACTION, BILATERAL      JOINT REPLACEMENT Right        Meds/Allergies:  Prior to Admission medications    Medication Sig Start Date End Date Taking?  Authorizing Provider   levothyroxine 100 mcg tablet Take by mouth    Historical Provider, MD metoprolol tartrate (LOPRESSOR) 25 mg tablet Take 12 5 mg by mouth 8/27/18   Historical Provider, MD   warfarin (COUMADIN) 2 5 mg tablet Take 2 5 mg by mouth 6/13/18   Historical Provider, MD     I have reviewed home medications with patient personally  Allergies: No Known Allergies    Social History:  Marital Status: /Civil Union   Occupation: n/a  Patient Pre-hospital Living Situation: home  Patient Pre-hospital Level of Mobility: limited  Patient Pre-hospital Diet Restrictions: n/a  Substance Use History:     Social History     Substance and Sexual Activity   Alcohol Use Never    Frequency: Never     Social History     Tobacco Use   Smoking Status Never Smoker   Smokeless Tobacco Never Used     Social History     Substance and Sexual Activity   Drug Use Never       Family History:  I have reviewed the patients family history    Physical Exam:   Vitals:        Physical Exam   Constitutional: He appears well-developed  HENT:   Head: Normocephalic and atraumatic  Neck: Neck supple  Pulmonary/Chest: He is in respiratory distress  He has rales  Abdominal: Soft  He exhibits distension  Musculoskeletal: He exhibits edema  Neurological:   Sedated   Skin: Skin is warm  Capillary refill takes less than 2 seconds  Psychiatric:   Unable to assess   Nursing note and vitals reviewed  Additional Data:   Lab Results: I have personally reviewed pertinent reports        Results from last 7 days   Lab Units 09/26/19  0251   WBC Thousand/uL 9 90   HEMOGLOBIN g/dL 14 3   HEMATOCRIT % 42 9   PLATELETS Thousands/uL 247   NEUTROS PCT % 52   LYMPHS PCT % 31   MONOS PCT % 13*   EOS PCT % 3     Results from last 7 days   Lab Units 09/26/19  0251   POTASSIUM mmol/L 4 4   CHLORIDE mmol/L 107   CO2 mmol/L 24   BUN mg/dL 27*   CREATININE mg/dL 1 42*   CALCIUM mg/dL 9 4   ALK PHOS U/L 105   ALT U/L 16   AST U/L 22     Results from last 7 days   Lab Units 09/26/19  0251   INR  3 52*               Imaging: I have personally reviewed pertinent reports  No orders to display       EKG, Pathology, and Other Studies Reviewed on Admission:   · EKG: n/a    NetAccess/Epic Records Reviewed: Yes     ** Please Note: This note has been constructed using a voice recognition system   **

## 2019-09-29 NOTE — PROGRESS NOTES
Progress Note - Evangelina Montenegro 3/4/1924, 80 y o  male MRN: [id number]    Unit/Bed#: -01 Encounter: [id number]    Primary Care Provider: ELDER Hagen   Date and time admitted to hospital: 9/27/2019  7:13 PM        * Hospice care  Assessment & Plan  · Comfort care measures as per hospice        VTE Pharmacologic Prophylaxis: Pharmacologic: hospice    Patient Centered Rounds: I have performed bedside rounds with nursing staff today  Discussions with Specialists or Other Care Team Provider: hospice team  Education and Discussions with Family / Patient: family at bedside    Current Length of Stay: 0 day(s)    Current Patient Status: Hospice   Certification Statement: The patient will continue to require additional inpatient hospital stay due to hospice care    Discharge Plan: hospice    Code Status: Level 4 - Comfort Care    Subjective:   Patient seen, resting comfortably    Objective:     Vitals:   No data recorded  There is no height or weight on file to calculate BMI  Input and Output Summary (last 24 hours): Intake/Output Summary (Last 24 hours) at 9/29/2019 1118  Last data filed at 9/29/2019 5788  Gross per 24 hour   Intake --   Output 250 ml   Net -250 ml       Physical Exam:     Physical Exam   Constitutional: No distress  Sedated   HENT:   Head: Normocephalic and atraumatic  Neck: Neck supple  Pulmonary/Chest: He has rales  Abdominal: Soft  There is no tenderness  Musculoskeletal: He exhibits edema  Neurological:   Unable to assess   Skin: He is not diaphoretic  Psychiatric:   Unable to assess   Nursing note and vitals reviewed        Additional Data:     Labs:    Results from last 7 days   Lab Units 09/26/19  0251   WBC Thousand/uL 9 90   HEMOGLOBIN g/dL 14 3   HEMATOCRIT % 42 9   PLATELETS Thousands/uL 247   NEUTROS PCT % 52   LYMPHS PCT % 31   MONOS PCT % 13*   EOS PCT % 3     Results from last 7 days   Lab Units 09/26/19  0251   POTASSIUM mmol/L 4 4 CHLORIDE mmol/L 107   CO2 mmol/L 24   BUN mg/dL 27*   CREATININE mg/dL 1 42*   CALCIUM mg/dL 9 4   ALK PHOS U/L 105   ALT U/L 16   AST U/L 22     Results from last 7 days   Lab Units 09/26/19  0251   INR  3 52*               * I Have Reviewed All Lab Data Listed Above  * Additional Pertinent Lab Tests Reviewed: [address] Admission  Reviewed    Imaging:  Imaging Reports Reviewed Today Include: n/a    Recent Cultures (last 7 days):     Results from last 7 days   Lab Units 09/26/19  0522   URINE CULTURE  30,000-39,000 cfu/ml Candida albicans*       Last 24 Hours Medication List:     Current Facility-Administered Medications:  LORazepam 1 mg Intravenous Q1H PRN Phoenix Rice PA-C   morphine injection 2 mg Intravenous Q1H PRN Maggie Albright MD   scopolamine 1 patch Transdermal Pascual Toure MD        Today, Patient Was Seen By: Maggie Albright MD    ** Please Note: Dictation voice to text software may have been used in the creation of this document   **

## 2019-09-29 NOTE — UTILIZATION REVIEW
Notification of Inpatient Admission/Inpatient Authorization Request  This is a Notification of Inpatient Admission/Request for Inpatient Authorization for our facility [facility]  Be advised that this patient was admitted to our facility under Inpatient Status  Please contact the Utilization Review Department where the patient is receiving care services for additional admission information  Place of Service Code: 24   Place of Service Name: Inpatient Hospital  Presentation Date & Time: 9/27/2019  7:13 PM  Inpatient Admission Date & Time: 9/27/19 1913  Discharge Date & Time: No discharge date for patient encounter  Discharge Disposition (if discharged): [address] [place] [address]  Attending Physician & NPI#: Praveen Goodell, [place] [[id number]]  Admission Orders (From admission, onward)     Ordered        09/28/19 1538  Admit Patient to  Once                   Facility: [facility] Utilization Review Department  Phone: [phone]; Fax [phone]  [email or web address]  ATTENTION: Please call with any questions or concerns to [phone]  and carefully listen to the prompts so that you are directed to the right person  Send all requests for admission clinical reviews, approved or denied determinations and any other requests to fax [phone]   All voicemails are confidential

## 2019-09-29 NOTE — PLAN OF CARE
Problem: PAIN - ADULT  Goal: Verbalizes/displays adequate comfort level or baseline comfort level  Description  Interventions:  - Encourage patient to monitor pain and request assistance  - Assess pain using appropriate pain scale  - Administer analgesics based on type and severity of pain and evaluate response  - Implement non-pharmacological measures as appropriate and evaluate response  - Consider cultural and social influences on pain and pain management  - Notify physician/advanced practitioner if interventions unsuccessful or patient reports new pain  Outcome: Progressing     Problem: Knowledge Deficit  Goal: Patient/family/caregiver demonstrates understanding of disease process, treatment plan, medications, and discharge instructions  Description  Complete learning assessment and assess knowledge base    Interventions:  - Provide teaching at level of understanding  - Provide teaching via preferred learning methods  Outcome: Progressing     Problem: Prexisting or High Potential for Compromised Skin Integrity  Goal: Skin integrity is maintained or improved  Description  INTERVENTIONS:  - Identify patients at risk for skin breakdown  - Assess and monitor skin integrity  - Assess and monitor nutrition and hydration status  - Monitor labs   - Assess for incontinence   - Turn and reposition patient  - Assist with mobility/ambulation  - Relieve pressure over bony prominences  - Avoid friction and shearing  - Provide appropriate hygiene as needed including keeping skin clean and dry  - Evaluate need for skin moisturizer/barrier cream  - Collaborate with interdisciplinary team   - Patient/family teaching  - Consider wound care consult   Outcome: Progressing

## 2019-09-29 NOTE — PLAN OF CARE
Problem: PAIN - ADULT  Goal: Verbalizes/displays adequate comfort level or baseline comfort level  Description  Interventions:  - Encourage patient to monitor pain and request assistance  - Assess pain using appropriate pain scale  - Administer analgesics based on type and severity of pain and evaluate response  - Implement non-pharmacological measures as appropriate and evaluate response  - Consider cultural and social influences on pain and pain management  - Notify physician/advanced practitioner if interventions unsuccessful or patient reports new pain  Outcome: Progressing     Problem: DISCHARGE PLANNING  Goal: Discharge to home or other facility with appropriate resources  Outcome: Progressing     Problem: Knowledge Deficit  Goal: Patient/family/caregiver demonstrates understanding of disease process, treatment plan, medications, and discharge instructions  Description  Complete learning assessment and assess knowledge base    Interventions:  - Provide teaching at level of understanding  - Provide teaching via preferred learning methods  Outcome: Progressing     Problem: Prexisting or High Potential for Compromised Skin Integrity  Goal: Skin integrity is maintained or improved  Description  INTERVENTIONS:  - Identify patients at risk for skin breakdown  - Assess and monitor skin integrity  - Assess and monitor nutrition and hydration status  - Monitor labs   - Assess for incontinence   - Turn and reposition patient  - Assist with mobility/ambulation  - Relieve pressure over bony prominences  - Avoid friction and shearing  - Provide appropriate hygiene as needed including keeping skin clean and dry  - Evaluate need for skin moisturizer/barrier cream  - Collaborate with interdisciplinary team   - Patient/family teaching  - Consider wound care consult   Outcome: Progressing

## 2019-09-29 NOTE — PROGRESS NOTES
Pt without changes overnight  His daughter remains at the bedside  Pt has pauses in his breathing throughout the night  PRNs given as per family request and per pt comfort

## 2019-09-29 NOTE — HOSPICE NOTE
RN Hospice Note    Callie Agustin is a Hospice Patient  Current active issues include: patient is actively dying   having increased episodes of apnea    Has increased episodes of apnea when head of bed is lowered   decreased urine output  Current interventions include: oral care   comfort measures   iv morphine and lorazepam  scop patch  Response to interventions: pain ad 0   decreased terminal congestion  Feliciano Jefferson Updated/planned interventions include: patient is not safe for transfer     Patient/Family discussion covered: family is at bedside continues to support each other     Patient/Family acceptance: family is aware that patient is dying   request continued hospice services   comfort measures  Feliciano Jefferson

## 2019-09-30 NOTE — DEATH NOTE
INPATIENT DEATH NOTE  Jo Rowe 80 y o  male MRN: [id number]  Unit/Bed#: -01 Encounter: [id number]    Date, Time and Cause of Death    Date of Death:  9/29/19  Time of Death:   9:05 PM  Preliminary Cause of Death:  Acute ischemic left MCA stroke (HCC)              PHYSICAL EXAM:  Unresponsive to noxious stimuli, Spontaneous respirations absent, Breath sounds absent and Heart sounds absent    Medical Examiner notification criteria:  No   Medical Examiner's office notified?:  No, does not meet ME notification criteria          Autopsy Options:  N/a    Primary Service Attending Physician notified?:  yes - Attending:  Zuleyma Arnold MD    Physician/Resident responsible for completing Discharge Summary:  Henok Bolanos PA-C

## 2019-09-30 NOTE — ASSESSMENT & PLAN NOTE
· Patient found to have acute left hyperdense MCA sinus concerning for stroke on admission to the ER on 09/26/2019  · He had poor prognosis and family elected hospice care

## 2019-09-30 NOTE — UTILIZATION REVIEW
Notification of Discharge  This is a Notification of Discharge from our facility [address]  Please be advised that this patient has been discharge from our facility  Below you will find the admission and discharge date and time including the patients disposition  PRESENTATION DATE: 2019  3:09 AM  OBS ADMISSION DATE:   IP ADMISSION DATE: 19   DISCHARGE DATE: 2019  7:08 PM  DISPOSITION:     Admission Orders listed below:  Admission Orders (From admission, onward)     Ordered        19  Inpatient Admission (expected length of stay for this patient Order details is greater than two midnights)  Once                   Please contact the UR Department if additional information is required to close this patient's authorization/case  [address]  Utilization Review Department  Phone: [phone]; Fax [phone]  [email or web address]  ATTENTION: Please call with any questions or concerns to [phone]  and carefully listen to the prompts so that you are directed to the right person  Send all requests for admission clinical reviews, approved or denied determinations and any other requests to fax [phone]   All voicemails are confidential

## 2019-09-30 NOTE — DISCHARGE SUMMARY
Discharge- Evangelina Montenegro 3/4/1924, 80 y o  male MRN: [id number]    Unit/Bed#: -01 Encounter: [id number]    Primary Care Provider: ELDER Hagen   Date and time admitted to hospital: 2019  7:13 PM        Acute ischemic left ICA stroke [facility])  Assessment & Plan  · Patient found to have acute left hyperdense MCA sinus concerning for stroke on admission to the ER on 2019  · He had poor prognosis and family elected hospice care    * Hospice care  Assessment & Plan  · Comfort care measures as per hospice  · Patient was transitioned to hospice care on 2019  · Patient  2019 at 9:05 p m  Resolved Problems  Date Reviewed: 2019    None          Admission Date:   Admission Orders (From admission, onward)     Ordered        19 1538  Admit Patient to  Once                     Admitting Diagnosis: CVA (cerebral vascular accident) ([facility] [address] ) [I63 9]    HPI:  Patient was brought to the ER on 2019 secondary to altered mental status was found to have extensive acute ischemic left ICA stroke  Patient had poor function he was evaluated by Neurology in the emergency room and neuro interventional Radiology  No procedures were elected  Family elected to pursue comfort care at that time and and later was transitioned to hospice  Procedures Performed: No orders of the defined types were placed in this encounter  Summary of Hospital Course:  Patient was transitioned to hospice on 2019 per H&P  He cared medications were provided as per the hospice direction  Patient  on 2019 at 9:05 p m  Aníbal Cooney Please see initial hospitalization for full details of prior admission leading to the hospice admission  Significant Findings, Care, Treatment and Services Provided:  Hospice care    Complications:  Death    Condition at Time of Death:   2019 at 9:05 p m      Final Diagnosis:  Acute ischemic left MCA stroke    Date, Time and Cause of Death Date of Death:  19  Time of Death:   9:05 PM  Preliminary Cause of Death:  Acute ischemic left MCA stroke [facility])         PCP: ELDER Wilcox    Disposition:

## 2019-09-30 NOTE — NURSING NOTE
Patient's IV flushed with resistance  No redness or edema noted at insertion site  Attempted to initiate another site  Daughter at bedside and agreeable at this time  Patient responded to pain and it was shortly after IV stick was performed that patient was noted to be apneic and without a pulse  Daughter informed of same and family members contacted by same  Family members remain at bedside at present  PA and nursing supervisor made aware of death  [facility] [employer] [facility] contacted

## 2019-09-30 NOTE — ASSESSMENT & PLAN NOTE
· Comfort care measures as per hospice  · Patient was transitioned to hospice care on 2019  · Patient  2019 at 9:05 p m